# Patient Record
Sex: FEMALE | Race: WHITE | NOT HISPANIC OR LATINO | Employment: FULL TIME | URBAN - METROPOLITAN AREA
[De-identification: names, ages, dates, MRNs, and addresses within clinical notes are randomized per-mention and may not be internally consistent; named-entity substitution may affect disease eponyms.]

---

## 2018-07-20 ENCOUNTER — OFFICE VISIT (OUTPATIENT)
Dept: OBGYN CLINIC | Facility: CLINIC | Age: 53
End: 2018-07-20
Payer: COMMERCIAL

## 2018-07-20 VITALS
WEIGHT: 175 LBS | HEART RATE: 72 BPM | BODY MASS INDEX: 34.36 KG/M2 | DIASTOLIC BLOOD PRESSURE: 84 MMHG | SYSTOLIC BLOOD PRESSURE: 130 MMHG | HEIGHT: 60 IN

## 2018-07-20 DIAGNOSIS — M79.89 MASS OF SOFT TISSUE OF RIGHT UPPER EXTREMITY: Primary | ICD-10-CM

## 2018-07-20 PROCEDURE — 20612 ASPIRATE/INJ GANGLION CYST: CPT | Performed by: ORTHOPAEDIC SURGERY

## 2018-07-20 PROCEDURE — 99203 OFFICE O/P NEW LOW 30 MIN: CPT | Performed by: ORTHOPAEDIC SURGERY

## 2018-07-20 RX ORDER — LEVOTHYROXINE SODIUM 0.07 MG/1
75 TABLET ORAL
COMMUNITY
Start: 2018-05-23

## 2018-07-20 RX ORDER — MONTELUKAST SODIUM 10 MG/1
10 TABLET ORAL
COMMUNITY

## 2018-07-20 RX ORDER — LIDOCAINE HYDROCHLORIDE 10 MG/ML
0.5 INJECTION, SOLUTION INFILTRATION; PERINEURAL
Status: COMPLETED | OUTPATIENT
Start: 2018-07-20 | End: 2018-07-20

## 2018-07-20 RX ORDER — HYDROCHLOROTHIAZIDE 25 MG/1
TABLET ORAL
COMMUNITY
Start: 2018-06-11 | End: 2019-12-31 | Stop reason: ALTCHOICE

## 2018-07-20 RX ORDER — MELATONIN 10 MG
TABLET, SUBLINGUAL SUBLINGUAL
COMMUNITY

## 2018-07-20 RX ORDER — DULOXETIN HYDROCHLORIDE 60 MG/1
60 CAPSULE, DELAYED RELEASE ORAL DAILY
COMMUNITY
Start: 2018-07-06

## 2018-07-20 RX ORDER — BUDESONIDE AND FORMOTEROL FUMARATE DIHYDRATE 160; 4.5 UG/1; UG/1
2 AEROSOL RESPIRATORY (INHALATION) 2 TIMES DAILY
Refills: 12 | COMMUNITY
Start: 2018-07-08

## 2018-07-20 RX ORDER — BETAMETHASONE SODIUM PHOSPHATE AND BETAMETHASONE ACETATE 3; 3 MG/ML; MG/ML
6 INJECTION, SUSPENSION INTRA-ARTICULAR; INTRALESIONAL; INTRAMUSCULAR; SOFT TISSUE
Status: COMPLETED | OUTPATIENT
Start: 2018-07-20 | End: 2018-07-20

## 2018-07-20 RX ADMIN — BETAMETHASONE SODIUM PHOSPHATE AND BETAMETHASONE ACETATE 6 MG: 3; 3 INJECTION, SUSPENSION INTRA-ARTICULAR; INTRALESIONAL; INTRAMUSCULAR; SOFT TISSUE at 15:44

## 2018-07-20 RX ADMIN — LIDOCAINE HYDROCHLORIDE 0.5 ML: 10 INJECTION, SOLUTION INFILTRATION; PERINEURAL at 15:44

## 2018-07-20 NOTE — PROGRESS NOTES
Assessment:     1  Mass of soft tissue of right upper extremity        Plan:     Problem List Items Addressed This Visit        Other    Mass of soft tissue of right upper extremity - Primary     Findings consistent with right wrist soft tissue mass, most likely callous formation due to repetitive pressure irritation  Discussed findings and treatment options with the patient  I have try aspirating the mass but was not able to year old any fluid from it  I did provide patient cortisone injection in the area to see if that will lessen the inflammation  I advised patient to obtain a gel pad to rest on when she writes or type  We will see patient back in 4 weeks for re-evaluation  All patient's questions were answered to her satisfaction  This note is created using dictation transcription  It may contain typographical errors, grammatical errors, improperly dictated words, background noise and other errors  Relevant Medications    lidocaine (XYLOCAINE) 1 % injection 0 5 mL (Completed)    betamethasone acetate-betamethasone sodium phosphate (CELESTONE) injection 6 mg (Completed)    Other Relevant Orders    Hand/upper extremity injection (Completed)         Subjective:     Patient ID: Amador Vela is a 48 y o  female  Chief Complaint:  72-year-old female noticed a mass over the ulna aspect of her wrist  She denies any injury to her wrist  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  She has intermittent discomfort over the mass  She denies pain over the mass when she move her wrist and use her hand  The location of the mass is over the ulnar aspect of the wrist crease  Information on patient's intake form was reviewed        Allergy:  Allergies   Allergen Reactions    Azithromycin Rash    Hyoscyamine Rash    Sulfamethoxazole Rash    Trimethoprim Rash     Medications:  all current active meds have been reviewed  Past Medical History:  Past Medical History:   Diagnosis Date    Asthma     Deep vein thrombosis (DVT) (HCC)     Depression     Factor V Leiden (HCC)     Fibromyalgia     GERD (gastroesophageal reflux disease)     Hypercholesteremia     Hypertension     Hypothyroid     IBS (irritable bowel syndrome)     Migraine     Obesity     Uterine leiomyoma      Past Surgical History:  Past Surgical History:   Procedure Laterality Date    SINUS SURGERY       Family History:  Family History   Problem Relation Age of Onset    Diabetes Mother     Hypertension Mother     Diabetes Father     Hypertension Father     Diabetes Sister      Social History:  History   Alcohol use Not on file     History   Drug use: Unknown     History   Smoking Status    Not on file   Smokeless Tobacco    Not on file     Review of Systems   Constitutional: Negative  HENT: Negative  Eyes: Negative  Respiratory: Negative  Cardiovascular: Negative  Gastrointestinal: Negative  Endocrine: Negative  Genitourinary: Negative  Musculoskeletal: Negative for arthralgias (Right wrist)  Skin: Negative  Allergic/Immunologic: Negative  Neurological: Negative  Hematological: Negative  Psychiatric/Behavioral: Negative  Objective:  BP Readings from Last 1 Encounters:   07/20/18 130/84      Wt Readings from Last 1 Encounters:   07/20/18 79 4 kg (175 lb)      BMI:   Estimated body mass index is 34 18 kg/m² as calculated from the following:    Height as of this encounter: 5' (1 524 m)  Weight as of this encounter: 79 4 kg (175 lb)  BSA:   Estimated body surface area is 1 76 meters squared as calculated from the following:    Height as of this encounter: 5' (1 524 m)  Weight as of this encounter: 79 4 kg (175 lb)  Physical Exam   Constitutional: She is oriented to person, place, and time  She appears well-developed  HENT:   Head: Normocephalic and atraumatic  Eyes: EOM are normal  Pupils are equal, round, and reactive to light  Neck: Neck supple     Neurological: She is alert and oriented to person, place, and time  Skin: Skin is warm  Psychiatric: She has a normal mood and affect  Nursing note and vitals reviewed  Right Hand Exam     Tenderness   The patient is experiencing no tenderness  Range of Motion   The patient has normal right wrist ROM  Muscle Strength   The patient has normal right wrist strength  Other   Erythema: absent  Sensation: normal  Pulse: present    Comments:  Soft tissue mass over ulnar aspect of the wrist crease  The soft tissue mass does not appear to be mobile but translucent with light  Patient's right hand x-ray was not available for review  Radiology report was available for review which show no abnormality      Hand/upper extremity injection  Date/Time: 7/20/2018 3:44 PM  Consent given by: patient  Supporting Documentation  Indications: pain and therapeutic   Procedure Details  Condition:volar carpal ganglion Site: R wrist   Preparation: Patient was prepped and draped in the usual sterile fashion  Needle size: 18 G (20 G)  Ultrasound guidance: no  Approach: volar  Medications administered: 0 5 mL lidocaine 1 %; 6 mg betamethasone acetate-betamethasone sodium phosphate 6 (3-3) mg/mL  Aspirate amount: 0 mL  Patient tolerance: patient tolerated the procedure well with no immediate complications  Dressing:  Sterile dressing applied

## 2018-07-20 NOTE — ASSESSMENT & PLAN NOTE
Findings consistent with right wrist soft tissue mass, most likely callous formation due to repetitive pressure irritation  Discussed findings and treatment options with the patient  I have try aspirating the mass but was not able to year old any fluid from it  I did provide patient cortisone injection in the area to see if that will lessen the inflammation  I advised patient to obtain a gel pad to rest on when she writes or type  We will see patient back in 4 weeks for re-evaluation  All patient's questions were answered to her satisfaction  This note is created using dictation transcription  It may contain typographical errors, grammatical errors, improperly dictated words, background noise and other errors

## 2018-09-19 ENCOUNTER — OFFICE VISIT (OUTPATIENT)
Dept: OBGYN CLINIC | Facility: CLINIC | Age: 53
End: 2018-09-19
Payer: COMMERCIAL

## 2018-09-19 VITALS
BODY MASS INDEX: 34.55 KG/M2 | DIASTOLIC BLOOD PRESSURE: 86 MMHG | WEIGHT: 176 LBS | HEIGHT: 60 IN | SYSTOLIC BLOOD PRESSURE: 130 MMHG

## 2018-09-19 DIAGNOSIS — M79.89 MASS OF SOFT TISSUE OF RIGHT UPPER EXTREMITY: Primary | ICD-10-CM

## 2018-09-19 PROCEDURE — 99213 OFFICE O/P EST LOW 20 MIN: CPT | Performed by: ORTHOPAEDIC SURGERY

## 2018-09-19 NOTE — PROGRESS NOTES
Assessment:     1  Mass of soft tissue of right upper extremity        Plan:     Problem List Items Addressed This Visit        Other    Mass of soft tissue of right upper extremity - Primary     Findings consistent with right wrist soft tissue mass  Discussed findings and treatment options with the patient  Recommend MRI of her right wrist to better assess the soft tissue  I will make further treatment recommendation after the MRI  All patient's questions were answered to her satisfaction  This note is created using dictation transcription  It may contain typographical errors, grammatical errors, improperly dictated words, background noise and other errors  Relevant Orders    MRI wrist right wo contrast         Subjective:     Patient ID: Danny Montoya is a 48 y o  female  Chief Complaint:  53-year-old female noticed a mass over the ulna aspect of her wrist  She denies any injury to her wrist  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  She has intermittent discomfort over the mass  She denies pain over the mass when she move her wrist and use her hand  The location of the mass is over the ulnar aspect of the wrist crease  Cortisone injection from last visit did not help with her symptoms or the size of the mass        Allergy:  Allergies   Allergen Reactions    Azithromycin Rash    Hyoscyamine Rash    Sulfamethoxazole Rash    Trimethoprim Rash     Medications:  all current active meds have been reviewed  Past Medical History:  Past Medical History:   Diagnosis Date    Asthma     [facility]) [facility])     Depression     [facility] Leiden ([facility] [address] )     Fibromyalgia     GERD (gastroesophageal reflux disease)     Hypercholesteremia     Hypertension     Hypothyroid     IBS (irritable bowel syndrome)     Migraine     Obesity     Uterine leiomyoma      Past Surgical History:  Past Surgical History:   Procedure Laterality Date    SINUS SURGERY       Family History:  Family History   Problem Relation Age of Onset    Diabetes Mother     Hypertension Mother     Diabetes Father     Hypertension Father     Diabetes Sister      Social History:  History   Alcohol Use No     History   Drug Use No     History   Smoking Status    Never Smoker   Smokeless Tobacco    Never Used     Review of Systems   Constitutional: Negative  HENT: Negative  Eyes: Negative  Respiratory: Negative  Cardiovascular: Negative  Gastrointestinal: Negative  Endocrine: Negative  Genitourinary: Negative  Musculoskeletal: Positive for arthralgias (Right wrist)  Negative for joint swelling  Skin: Negative  Allergic/Immunologic: Negative  Neurological: Negative  Hematological: Negative  Psychiatric/Behavioral: Negative  Objective:  BP Readings from Last 1 Encounters:   09/19/18 130/86      Wt Readings from Last 1 Encounters:   09/19/18 79 8 kg (176 lb)      BMI:   Estimated body mass index is 34 37 kg/m² as calculated from the following:    Height as of this encounter: 5' (1 524 m)  Weight as of this encounter: 79 8 kg (176 lb)  BSA:   Estimated body surface area is 1 77 meters squared as calculated from the following:    Height as of this encounter: 5' (1 524 m)  Weight as of this encounter: 79 8 kg (176 lb)  Physical Exam   Constitutional: She is oriented to person, place, and time  She appears well-developed  HENT:   Head: Normocephalic and atraumatic  Eyes: EOM are normal  Pupils are equal, round, and reactive to light  Neck: Neck supple  Pulmonary/Chest: Effort normal    Neurological: She is alert and oriented to person, place, and time  Skin: Skin is warm  Psychiatric: She has a normal mood and affect  Nursing note and vitals reviewed  Right Hand Exam     Tenderness   The patient is experiencing no tenderness  Range of Motion   The patient has normal right wrist ROM       Muscle Strength   The patient has normal right wrist strength  Other   Erythema: absent  Sensation: normal  Pulse: present    Comments:  Soft tissue mass over ulnar aspect of the wrist crease  The soft tissue mass does not appear to be mobile but translucent with light  About 1 cm in diameter              Procedures

## 2018-09-19 NOTE — ASSESSMENT & PLAN NOTE
Findings consistent with right wrist soft tissue mass  Discussed findings and treatment options with the patient  Recommend MRI of her right wrist to better assess the soft tissue  I will make further treatment recommendation after the MRI  All patient's questions were answered to her satisfaction  This note is created using dictation transcription  It may contain typographical errors, grammatical errors, improperly dictated words, background noise and other errors

## 2018-09-26 ENCOUNTER — OFFICE VISIT (OUTPATIENT)
Dept: OBGYN CLINIC | Facility: CLINIC | Age: 53
End: 2018-09-26
Payer: COMMERCIAL

## 2018-09-26 VITALS
WEIGHT: 176 LBS | BODY MASS INDEX: 34.55 KG/M2 | HEART RATE: 78 BPM | SYSTOLIC BLOOD PRESSURE: 122 MMHG | HEIGHT: 60 IN | DIASTOLIC BLOOD PRESSURE: 80 MMHG

## 2018-09-26 DIAGNOSIS — M79.89 MASS OF SOFT TISSUE OF RIGHT UPPER EXTREMITY: Primary | ICD-10-CM

## 2018-09-26 PROCEDURE — 99213 OFFICE O/P EST LOW 20 MIN: CPT | Performed by: ORTHOPAEDIC SURGERY

## 2018-09-26 NOTE — PROGRESS NOTES
Assessment:     1  Mass of soft tissue of right upper extremity        Plan:     Problem List Items Addressed This Visit        Other    Mass of soft tissue of right upper extremity - Primary     Findings consistent with right wrist soft tissue mass which was identified as a normal structure of her wrist   Discussed findings and treatment options with the patient  I have review patient's right wrist MRI with her  Patient most likely is irritating the pisiform when she rests her wrist against hard surface  I advised patient to use a gel pad when she has to write or type to avoid continue irritation to the prominence  I advised patient to contact me if her symptoms changed, otherwise will see patient back as needed  All patient's questions were answered to her satisfaction  This note is created using dictation transcription  It may contain typographical errors, grammatical errors, improperly dictated words, background noise and other errors  Subjective:     Patient ID: Carl Chery is a 48 y o  female  Chief Complaint:  65-year-old female noticed a mass over the ulna aspect of her right wrist  She denies any injury to her wrist  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  She has intermittent discomfort over the mass  She denies pain over the mass when she move her wrist and use her hand  The location of the mass is over the ulnar aspect of the wrist crease  Cortisone injection from last visit did not help with her symptoms or the size of the mass  Patient returns today to review her right wrist MRI        Allergy:  Allergies   Allergen Reactions    Azithromycin Rash    Hyoscyamine Rash    Sulfamethoxazole Rash    Trimethoprim Rash     Medications:  all current active meds have been reviewed  Past Medical History:  Past Medical History:   Diagnosis Date    Asthma     Deep vein thrombosis (DVT) (HCC)     Depression     Factor V Leiden (HCC)     Fibromyalgia     GERD (gastroesophageal reflux disease)     Hypercholesteremia     Hypertension     Hypothyroid     IBS (irritable bowel syndrome)     Migraine     Obesity     Uterine leiomyoma      Past Surgical History:  Past Surgical History:   Procedure Laterality Date    SINUS SURGERY       Family History:  Family History   Problem Relation Age of Onset    Diabetes Mother     Hypertension Mother     Diabetes Father     Hypertension Father     Diabetes Sister      Social History:  History   Alcohol Use No     History   Drug Use No     History   Smoking Status    Never Smoker   Smokeless Tobacco    Never Used     Review of Systems   Constitutional: Negative  HENT: Negative  Eyes: Negative  Respiratory: Negative  Cardiovascular: Negative  Gastrointestinal: Negative  Endocrine: Negative  Genitourinary: Negative  Musculoskeletal: Positive for arthralgias (Right wrist)  Negative for joint swelling  Skin: Negative  Allergic/Immunologic: Negative  Neurological: Negative  Negative for weakness and numbness  Hematological: Negative  Psychiatric/Behavioral: Negative  Objective:  BP Readings from Last 1 Encounters:   09/26/18 122/80      Wt Readings from Last 1 Encounters:   09/26/18 79 8 kg (176 lb)      BMI:   Estimated body mass index is 34 37 kg/m² as calculated from the following:    Height as of this encounter: 5' (1 524 m)  Weight as of this encounter: 79 8 kg (176 lb)  BSA:   Estimated body surface area is 1 77 meters squared as calculated from the following:    Height as of this encounter: 5' (1 524 m)  Weight as of this encounter: 79 8 kg (176 lb)  Physical Exam   Constitutional: She is oriented to person, place, and time  She appears well-developed and well-nourished  HENT:   Head: Normocephalic and atraumatic  Eyes: Conjunctivae and EOM are normal    Neck: Neck supple     Pulmonary/Chest: Effort normal    Neurological: She is alert and oriented to person, place, and time  Skin: Skin is warm  Psychiatric: She has a normal mood and affect  Nursing note and vitals reviewed  Right Hand Exam     Tenderness   The patient is experiencing no tenderness  Range of Motion   The patient has normal right wrist ROM  Muscle Strength   The patient has normal right wrist strength  Other   Erythema: absent  Sensation: normal  Pulse: present    Comments:  Soft tissue mass over ulnar aspect of the wrist crease  The soft tissue mass does not appear to be mobile but translucent with light  About 1 cm in diameter  Examination not changed            Right wrist MRI on a disc showed no soft tissue lesion over the volar ulnar aspect of the wrist   Report was also reviewed with the patient  The the mass appeared to be identified as Pisiform and FCU tendon      Procedures

## 2018-09-26 NOTE — ASSESSMENT & PLAN NOTE
Findings consistent with right wrist soft tissue mass which was identified as a normal structure of her wrist   Discussed findings and treatment options with the patient  I have review patient's right wrist MRI with her  Patient most likely is irritating the pisiform when she rests her wrist against hard surface  I advised patient to use a gel pad when she has to write or type to avoid continue irritation to the prominence  I advised patient to contact me if her symptoms changed, otherwise will see patient back as needed  All patient's questions were answered to her satisfaction  This note is created using dictation transcription  It may contain typographical errors, grammatical errors, improperly dictated words, background noise and other errors

## 2019-12-31 ENCOUNTER — OFFICE VISIT (OUTPATIENT)
Dept: GASTROENTEROLOGY | Facility: CLINIC | Age: 54
End: 2019-12-31
Payer: COMMERCIAL

## 2019-12-31 VITALS
HEIGHT: 60 IN | HEART RATE: 98 BPM | DIASTOLIC BLOOD PRESSURE: 84 MMHG | SYSTOLIC BLOOD PRESSURE: 130 MMHG | WEIGHT: 175 LBS | BODY MASS INDEX: 34.36 KG/M2

## 2019-12-31 DIAGNOSIS — Z86.010 HISTORY OF COLON POLYPS: Primary | ICD-10-CM

## 2019-12-31 DIAGNOSIS — K58.1 IRRITABLE BOWEL SYNDROME WITH CONSTIPATION: Primary | ICD-10-CM

## 2019-12-31 DIAGNOSIS — Z86.010 HISTORY OF COLON POLYPS: ICD-10-CM

## 2019-12-31 DIAGNOSIS — K21.9 GASTROESOPHAGEAL REFLUX DISEASE, ESOPHAGITIS PRESENCE NOT SPECIFIED: ICD-10-CM

## 2019-12-31 PROCEDURE — 99244 OFF/OP CNSLTJ NEW/EST MOD 40: CPT | Performed by: INTERNAL MEDICINE

## 2019-12-31 RX ORDER — DOCUSATE SODIUM 100 MG/1
100 CAPSULE, LIQUID FILLED ORAL 2 TIMES DAILY
Qty: 10 CAPSULE | Refills: 0 | Status: SHIPPED | OUTPATIENT
Start: 2019-12-31 | End: 2020-02-20 | Stop reason: SDUPTHER

## 2019-12-31 RX ORDER — ALBUTEROL SULFATE 90 UG/1
2 AEROSOL, METERED RESPIRATORY (INHALATION) EVERY 6 HOURS PRN
COMMUNITY

## 2019-12-31 RX ORDER — FLUTICASONE PROPIONATE 50 MCG
2 SPRAY, SUSPENSION (ML) NASAL DAILY
COMMUNITY

## 2019-12-31 RX ORDER — ATORVASTATIN CALCIUM 10 MG/1
1 TABLET, FILM COATED ORAL DAILY
COMMUNITY
Start: 2019-11-13

## 2019-12-31 RX ORDER — LISINOPRIL AND HYDROCHLOROTHIAZIDE 12.5; 1 MG/1; MG/1
1 TABLET ORAL DAILY
COMMUNITY
Start: 2019-12-26

## 2019-12-31 NOTE — PROGRESS NOTES
[id number] - Outpatient Consultation  Carolyn Montana 48 y o  female MRN: [id number]  Encounter: [id number]    ASSESSMENT AND PLAN:      1  Irritable bowel syndrome with constipation  I believe the majority of her symptoms go along with this  One interesting thing is when she took antibiotics for a sinus infection she thought her bowels got a little bit better  Other possibilities would be small intestinal bacterial overgrowth  I do not believe this is diverticulitis  I would recommend a breath test, colonoscopy trial of Linzess and Colace  - linaCLOtide 290 MCG CAPS; Take 1 capsule by mouth daily  Dispense: 30 capsule; Refill: 2  - docusate sodium (COLACE) 100 mg capsule; Take 1 capsule (100 mg total) by mouth 2 (two) times a day  Dispense: 10 capsule; Refill: 0    2  History of colon polyps  Will be due soon for follow-up    3  Gastroesophageal reflux disease, esophagitis presence not specified  Presently stable      Followup Appointment:  6 weeks  ______________________________________________________________________    Chief Complaint   Patient presents with    Irritable Bowel Syndrome    Constipation     getting worse and BM only once a week    Anal Lump       HPI:   Patient is a very pleasant 79-year-old female who we last saw in the office in 2012 for acid reflux and irritable bowel  She does have a history of colon polyps  She reports that over the last several months she has been increasingly constipated she moves her bowels perhaps once or twice a week  It is hard no bleeding that she has a vague lower abdominal bloating and discomfort  She denies any fever sweats or chills  Her acid reflux is generally controlled she has no symptoms of heartburn no dysphagia no nausea or vomiting  Her weight has been stable or increasing    Past Medical History:   Diagnosis Date    Asthma     [facility]T) ([facility] )     Depression     Factor V Leiden ([facility] )  Fibromyalgia     GERD (gastroesophageal reflux disease)     Hypercholesteremia     Hypertension     Hypothyroid     IBS (irritable bowel syndrome)     Migraine     Obesity     Uterine leiomyoma      Past Surgical History:   Procedure Laterality Date    COLONOSCOPY  12/15/2017    EGD  04/13/2012    SINUS SURGERY       Social History     Substance and Sexual Activity   Alcohol Use No     Social History     Substance and Sexual Activity   Drug Use No     Social History     Tobacco Use   Smoking Status Never Smoker   Smokeless Tobacco Never Used     Family History   Problem Relation Age of Onset    Diabetes Mother     Hypertension Mother     Diabetes Father     Hypertension Father     Diabetes Sister     Polycystic ovary syndrome Sister     Thyroid disease Sister     Colon cancer Neg Hx     Colon polyps Neg Hx        Meds/Allergies     Current Outpatient Medications:     albuterol (PROVENTIL HFA,VENTOLIN HFA) 90 mcg/act inhaler    atorvastatin (LIPITOR) 10 mg tablet    DULoxetine (CYMBALTA) 60 mg delayed release capsule    fluticasone (FLONASE) 50 mcg/act nasal spray    levothyroxine 75 mcg tablet    lisinopril-hydrochlorothiazide (PRINZIDE,ZESTORETIC) 10-12 5 MG per tablet    loratadine-pseudoephedrine (CLARITIN-D 12-HOUR) 5-120 mg per tablet    montelukast (SINGULAIR) 10 mg tablet    Probiotic Product (PROBIOTIC ADVANCED PO)    SYMBICORT 160-4 5 MCG/ACT inhaler    Cholecalciferol (VITAMIN D3) 15791 units TABS    cyanocobalamin 1000 MCG tablet    docusate sodium (COLACE) 100 mg capsule    linaCLOtide 290 MCG CAPS    Allergies   Allergen Reactions    Azithromycin Rash    Hyoscyamine Rash    Sulfamethoxazole Rash    Trimethoprim Rash       PHYSICAL EXAM:    Blood pressure 130/84, pulse 98, height 4' 11 5" (1 511 m), weight 79 4 kg (175 lb)  Body mass index is 34 75 kg/m²    General Appearance: NAD, cooperative, alert  Eyes: Anicteric, PERRLA, EOMI  ENT:  Normocephalic, atraumatic, normal mucosa  Neck:  Supple, symmetrical, trachea midline,   Resp:  Clear to auscultation bilaterally; no rales, rhonchi or wheezing; respirations unlabored   CV:  S1 S2, Regular rate and rhythm; no murmur, rub, or gallop  GI:  Soft, mild lower abdominal tenderness non-distended; normal bowel sounds; no masses, no organomegaly   Rectal:  Normal on external inspection  Musculoskeletal: No cyanosis, clubbing or edema  Normal ROM  Skin:  No jaundice, rashes, or lesions   Heme/Lymph: No palpable cervical lymphadenopathy  Psych: Normal affect, good eye contact  Neuro: No gross deficits, AAOx3    Lab Results:   No results found for: WBC, HGB, HCT, MCV, PLT  No results found for: NA, K, CL, CO2, ANIONGAP, BUN, CREATININE, GLUCOSE, GLUF, CALCIUM, CORRECTEDCA, AST, ALT, ALKPHOS, PROT, BILITOT, EGFR  No results found for: IRON, TIBC, FERRITIN  No results found for: LIPASE    Radiology Results:   No results found  REVIEW OF SYSTEMS:    CONSTITUTIONAL: Denies any fever, chills, rigors, and weight loss  HEENT: No earache or tinnitus  Denies hearing loss or visual disturbances  CARDIOVASCULAR: No chest pain or palpitations  RESPIRATORY: Denies any cough, hemoptysis, shortness of breath or dyspnea on exertion  GASTROINTESTINAL: As noted in the History of Present Illness  GENITOURINARY: No problems with urination  Denies any hematuria or dysuria  NEUROLOGIC: No dizziness or vertigo, denies headaches  MUSCULOSKELETAL: Denies any muscle or joint pain  SKIN: Denies skin rashes or itching  ENDOCRINE: Denies excessive thirst  Denies intolerance to heat or cold  PSYCHOSOCIAL: Denies depression or anxiety  Denies any recent memory loss

## 2020-01-15 ENCOUNTER — OFFICE VISIT (OUTPATIENT)
Dept: GASTROENTEROLOGY | Facility: CLINIC | Age: 55
End: 2020-01-15
Payer: COMMERCIAL

## 2020-01-15 DIAGNOSIS — K63.89 SMALL INTESTINAL BACTERIAL OVERGROWTH: ICD-10-CM

## 2020-01-15 DIAGNOSIS — K58.1 IRRITABLE BOWEL SYNDROME WITH CONSTIPATION: Primary | ICD-10-CM

## 2020-01-15 PROCEDURE — 91065 BREATH HYDROGEN/METHANE TEST: CPT | Performed by: INTERNAL MEDICINE

## 2020-01-15 NOTE — PROGRESS NOTES
[facility]  [address] [place] [address]   [phone]    Bacterial Overgrowth Analytical Record    Sam Ontiveros 50 y o  female MRN: [id number]      Date of Test: 1/15/20    Substrate Given: Lactulose    Ordering Provider: Dr Caroline Jensen Assistant: All    Symptoms: constipation and IBS    The patient presents for bacterial overgrowth testing  Patient fasted overnight  Baseline readings obtained  substrate was administered, and the patient drink without difficulty  Breath test performed every 20 min for a total of 3 hr    Sample Clock Time ppmH2 ppmCH4 Co2% Carlitos   Baseline 8:26 am   1 1 4 3 1 23   #1  20 minutes 8:48 am 6 1 5 5 0 97   #2  40 minutes 9:08 am 3 1 4 9 1 08   #3  60 minutes 9:29 am 5 1 5 1 1 03   #4  80 minutes 9:49 am 5 1 4 6 1 15   #5  100 minutes 10:09 am 19 3 4 1 1 29   #6  120 minutes 10:28 am 47 3 4 7 1 12   #7  140 minutes 10:49 am 87 5 4 0 1 32   #8  160 minutes 11:10 am 107 5 4 4 1 20   #9  180 minutes 11:30 am 106 5 4 0 1 32       Physician interpretation:  Positive breath test will notify patient and call in antibiotics

## 2020-01-16 RX ORDER — AMOXICILLIN AND CLAVULANATE POTASSIUM 875; 125 MG/1; MG/1
1 TABLET, FILM COATED ORAL EVERY 12 HOURS SCHEDULED
Qty: 20 TABLET | Refills: 0 | Status: SHIPPED | OUTPATIENT
Start: 2020-01-16 | End: 2020-01-17 | Stop reason: SDUPTHER

## 2020-01-16 NOTE — PATIENT INSTRUCTIONS
I left a voicemail for the patient regarding positive breath test call in Augmentin    I reviewed her allergy list she is not allergic to that

## 2020-01-17 DIAGNOSIS — K63.89 SMALL INTESTINAL BACTERIAL OVERGROWTH: ICD-10-CM

## 2020-01-17 RX ORDER — AMOXICILLIN AND CLAVULANATE POTASSIUM 875; 125 MG/1; MG/1
1 TABLET, FILM COATED ORAL EVERY 12 HOURS SCHEDULED
Qty: 28 TABLET | Refills: 0 | Status: SHIPPED | OUTPATIENT
Start: 2020-01-17 | End: 2020-01-31

## 2020-01-17 NOTE — TELEPHONE ENCOUNTER
Patient wanted to know if ok to proceed with colon on 1/30/2020  She is to start Augmentin for SIBO which would end 1/31/2020  Advised ok to proceed with colonoscopy

## 2020-01-17 NOTE — TELEPHONE ENCOUNTER
Pt left [facility] stating she got call yesterday from Dr Vena Osler about breath test that came back positive; is supposed to take Abx for 2 wks and has colon sched'd 1/30; asks for [phone] to discuss/questions if she needs to reschedule?

## 2020-02-20 ENCOUNTER — OFFICE VISIT (OUTPATIENT)
Dept: GASTROENTEROLOGY | Facility: CLINIC | Age: 55
End: 2020-02-20
Payer: COMMERCIAL

## 2020-02-20 VITALS
BODY MASS INDEX: 34.95 KG/M2 | SYSTOLIC BLOOD PRESSURE: 140 MMHG | DIASTOLIC BLOOD PRESSURE: 90 MMHG | WEIGHT: 178 LBS | HEART RATE: 96 BPM | HEIGHT: 60 IN

## 2020-02-20 DIAGNOSIS — Z86.010 HISTORY OF COLON POLYPS: ICD-10-CM

## 2020-02-20 DIAGNOSIS — K63.89 SMALL INTESTINAL BACTERIAL OVERGROWTH: Primary | ICD-10-CM

## 2020-02-20 DIAGNOSIS — K58.1 IRRITABLE BOWEL SYNDROME WITH CONSTIPATION: ICD-10-CM

## 2020-02-20 DIAGNOSIS — K21.9 GASTROESOPHAGEAL REFLUX DISEASE, ESOPHAGITIS PRESENCE NOT SPECIFIED: ICD-10-CM

## 2020-02-20 PROCEDURE — 99213 OFFICE O/P EST LOW 20 MIN: CPT | Performed by: INTERNAL MEDICINE

## 2020-02-20 RX ORDER — DOCUSATE SODIUM 100 MG/1
100 CAPSULE, LIQUID FILLED ORAL 2 TIMES DAILY
Qty: 10 CAPSULE | Refills: 0 | Status: SHIPPED | OUTPATIENT
Start: 2020-02-20

## 2020-02-20 NOTE — PROGRESS NOTES
[facility] - Outpatient Follow-up Note  Jamal Green 50 y o  female MRN: [id number]  Encounter: [id number]    ASSESSMENT AND PLAN:      1  Irritable bowel syndrome with constipation  We had a long discussion regarding the merits of fiber hydration I have asked her to continue her Colace and Linzess  The next step would be harsh her medications which I really do not want to do  She did not do well with MiraLax  - docusate sodium (COLACE) 100 mg capsule; Take 1 capsule (100 mg total) by mouth 2 (two) times a day  Dispense: 10 capsule; Refill: 0  - linaCLOtide 290 MCG CAPS; Take 1 capsule by mouth daily  Dispense: 30 capsule; Refill: 2    2  Small intestinal bacterial overgrowth  Treated minimal improvement    3  History of colon polyps  Negative colonoscopy just done 5 year recall    4  Gastroesophageal reflux disease, esophagitis presence not specified  All stable      Followup Appointment:  6 months  ______________________________________________________________________    Chief Complaint   Patient presents with    Follow-up     Colon/SIBO     HPI:  Treatment of the bacterial overgrowth with antibiotics resulted in attend 20% improvement  She now continues to complain of bloating and constipation Linzess helps but not 100%  Minimal symptoms of reflux      Historical Information   Past Medical History:   Diagnosis Date    Asthma     [facility]     Depression     Factor V Leiden ([facility])     Fibromyalgia     GERD (gastroesophageal reflux disease)     Hypercholesteremia     Hypertension     Hypothyroid     IBS (irritable bowel syndrome)     Migraine     Obesity     Uterine leiomyoma      Past Surgical History:   Procedure Laterality Date    COLONOSCOPY  12/15/2017    EGD  04/13/2012    SINUS SURGERY       Social History     Substance and Sexual Activity   Alcohol Use No     Social History     Substance and Sexual Activity   Drug Use No Social History     Tobacco Use   Smoking Status Never Smoker   Smokeless Tobacco Never Used     Family History   Problem Relation Age of Onset    Diabetes Mother     Hypertension Mother     Diabetes Father     Hypertension Father     Diabetes Sister     Polycystic ovary syndrome Sister     Thyroid disease Sister     Colon cancer Neg Hx     Colon polyps Neg Hx          Current Outpatient Medications:     albuterol (PROVENTIL HFA,VENTOLIN HFA) 90 mcg/act inhaler    atorvastatin (LIPITOR) 10 mg tablet    Cholecalciferol (VITAMIN D3) 88270 units TABS    cyanocobalamin 1000 MCG tablet    docusate sodium (COLACE) 100 mg capsule    DULoxetine (CYMBALTA) 60 mg delayed release capsule    fluticasone (FLONASE) 50 mcg/act nasal spray    levothyroxine 75 mcg tablet    linaCLOtide 290 MCG CAPS    lisinopril-hydrochlorothiazide (PRINZIDE,ZESTORETIC) 10-12 5 MG per tablet    loratadine-pseudoephedrine (CLARITIN-D 12-HOUR) 5-120 mg per tablet    montelukast (SINGULAIR) 10 mg tablet    Probiotic Product (PROBIOTIC ADVANCED PO)    SYMBICORT 160-4 5 MCG/ACT inhaler  Allergies   Allergen Reactions    Azithromycin Rash    Hyoscyamine Rash    Sulfamethoxazole Rash    Trimethoprim Rash     Reviewed medications and allergies and updated as indicated    PHYSICAL EXAM:    Blood pressure 140/90, pulse 96, height 4' 11 5" (1 511 m), weight 80 7 kg (178 lb)  Body mass index is 35 35 kg/m²  General Appearance: NAD, cooperative, alert  Eyes: Anicteric, PERRLA, EOMI  ENT:  Normocephalic, atraumatic, normal mucosa  Neck: , symmetrical, trachea midline  Rectal: Deferred  Musculoskeletal: No cyanosis, clubbing or edema  Normal ROM    Skin:  No jaundice, rashes, or lesions   Psych: Normal affect, good eye contact  Neuro: No gross deficits, AAOx3    Lab Results:   No results found for: WBC, HGB, HCT, MCV, PLT  No results found for: NA, K, CL, CO2, ANIONGAP, BUN, CREATININE, GLUCOSE, GLUF, CALCIUM, CORRECTEDCA, AST, ALT, ALKPHOS, PROT, BILITOT, EGFR  No results found for: IRON, TIBC, FERRITIN  No results found for: LIPASE    Radiology Results:   No results found

## 2020-06-16 DIAGNOSIS — K58.1 IRRITABLE BOWEL SYNDROME WITH CONSTIPATION: ICD-10-CM

## 2020-06-16 RX ORDER — LINACLOTIDE 290 UG/1
CAPSULE, GELATIN COATED ORAL
Qty: 30 CAPSULE | Refills: 2 | Status: SHIPPED | OUTPATIENT
Start: 2020-06-16 | End: 2020-09-04

## 2020-08-21 ENCOUNTER — OFFICE VISIT (OUTPATIENT)
Dept: GASTROENTEROLOGY | Facility: CLINIC | Age: 55
End: 2020-08-21
Payer: COMMERCIAL

## 2020-08-21 VITALS
TEMPERATURE: 96.4 F | WEIGHT: 175 LBS | HEIGHT: 60 IN | DIASTOLIC BLOOD PRESSURE: 80 MMHG | SYSTOLIC BLOOD PRESSURE: 128 MMHG | HEART RATE: 96 BPM | BODY MASS INDEX: 34.36 KG/M2

## 2020-08-21 DIAGNOSIS — K58.1 IRRITABLE BOWEL SYNDROME WITH CONSTIPATION: Primary | ICD-10-CM

## 2020-08-21 DIAGNOSIS — K21.9 GASTROESOPHAGEAL REFLUX DISEASE, ESOPHAGITIS PRESENCE NOT SPECIFIED: ICD-10-CM

## 2020-08-21 DIAGNOSIS — K63.89 SMALL INTESTINAL BACTERIAL OVERGROWTH: ICD-10-CM

## 2020-08-21 DIAGNOSIS — Z86.010 HISTORY OF COLON POLYPS: ICD-10-CM

## 2020-08-21 PROCEDURE — 99213 OFFICE O/P EST LOW 20 MIN: CPT | Performed by: INTERNAL MEDICINE

## 2020-08-21 NOTE — PROGRESS NOTES
[address] [facility] - Outpatient Follow-up Note  Cj Powers 50 y o  female MRN: [id number]  Encounter: [id number]    ASSESSMENT AND PLAN:      1  Irritable bowel syndrome with constipation  Is generally doing better taking Metamucil 2 capsules with a oz of water Senokot as well as Linzess  I asked her to resume Colace 200 mg daily  We discussed the merits of staying hydrated as well as physical activity which has declined somewhat for her during the [place]  2  Small intestinal bacterial overgrowth  Mild improvement with previous treatment  3  History of colon polyps  Up-to-date next colonoscopy 2025    4  Gastroesophageal reflux disease, esophagitis presence not specified  Stable asymptomatic      Followup Appointment:  1 year  ______________________________________________________________________    Chief Complaint   Patient presents with    Follow-up    Irritable Bowel Syndrome     HPI:  Doing reasonably well about once a week feels bloated  Generally moving her bowels better but still feels constipated  No rectal bleeding  Going several times a week      Historical Information   Past Medical History:   Diagnosis Date    Asthma     Deep vein thrombosis (DVT) (HCC)     Depression     Factor V Leiden (HCC)     Fibromyalgia     GERD (gastroesophageal reflux disease)     Hypercholesteremia     Hypertension     Hypothyroid     IBS (irritable bowel syndrome)     Migraine     Obesity     Uterine leiomyoma      Past Surgical History:   Procedure Laterality Date    COLONOSCOPY  12/15/2017    EGD  04/13/2012    SINUS SURGERY       Social History     Substance and Sexual Activity   Alcohol Use No     Social History     Substance and Sexual Activity   Drug Use No     Social History     Tobacco Use   Smoking Status Never Smoker   Smokeless Tobacco Never Used     Family History   Problem Relation Age of Onset    Diabetes Mother     Hypertension Mother    [facility] Diabetes Father     Hypertension Father     Diabetes Sister     Polycystic ovary syndrome Sister     Thyroid disease Sister     Colon cancer Neg Hx     Colon polyps Neg Hx          Current Outpatient Medications:     albuterol (PROVENTIL HFA,VENTOLIN HFA) 90 mcg/act inhaler    atorvastatin (LIPITOR) 10 mg tablet    Cholecalciferol (VITAMIN D3) 16537 units TABS    cyanocobalamin 1000 MCG tablet    DULoxetine (CYMBALTA) 60 mg delayed release capsule    fluticasone (FLONASE) 50 mcg/act nasal spray    levothyroxine 75 mcg tablet    LINZESS 290 MCG CAPS    lisinopril-hydrochlorothiazide (PRINZIDE,ZESTORETIC) 10-12 5 MG per tablet    loratadine-pseudoephedrine (CLARITIN-D 12-HOUR) 5-120 mg per tablet    montelukast (SINGULAIR) 10 mg tablet    Probiotic Product (PROBIOTIC ADVANCED PO)    SYMBICORT 160-4 5 MCG/ACT inhaler    docusate sodium (COLACE) 100 mg capsule  Allergies   Allergen Reactions    Azithromycin Rash    Hyoscyamine Rash    Sulfamethoxazole Rash    Trimethoprim Rash     Reviewed medications and allergies and updated as indicated    PHYSICAL EXAM:    Blood pressure 128/80, pulse 96, temperature (!) 96 4 °F (35 8 °C), height 5' (1 524 m), weight 79 4 kg (175 lb)  Body mass index is 34 18 kg/m²  General Appearance: NAD, cooperative, alert  Eyes: Anicteric, PERRLA, EOMI  ENT:  Normocephalic, atraumatic, normal mucosa  Neck:  Supple, symmetrical, trachea midline  Resp:  Clear to auscultation bilaterally; no rales, rhonchi or wheezing; respirations unlabored   CV:  S1 S2, Regular rate and rhythm; no murmur, rub, or gallop  GI:  Soft, non-tender, non-distended; normal bowel sounds; no masses, no organomegaly   Rectal: Deferred  Musculoskeletal: No cyanosis, clubbing or edema  Normal ROM    Skin:  No jaundice, rashes, or lesions   Heme/Lymph: No palpable cervical lymphadenopathy  Psych: Normal affect, good eye contact  Neuro: No gross deficits, AAOx3    Lab Results:   No results found for: WBC, HGB, HCT, MCV, PLT  No results found for: NA, K, CL, CO2, ANIONGAP, BUN, CREATININE, GLUCOSE, GLUF, CALCIUM, CORRECTEDCA, AST, ALT, ALKPHOS, PROT, BILITOT, EGFR  No results found for: IRON, TIBC, FERRITIN  No results found for: LIPASE    Radiology Results:   No results found

## 2020-09-04 DIAGNOSIS — K58.1 IRRITABLE BOWEL SYNDROME WITH CONSTIPATION: ICD-10-CM

## 2020-09-04 RX ORDER — LINACLOTIDE 290 UG/1
CAPSULE, GELATIN COATED ORAL
Qty: 30 CAPSULE | Refills: 2 | Status: SHIPPED | OUTPATIENT
Start: 2020-09-04 | End: 2020-12-03 | Stop reason: SDUPTHER

## 2020-12-03 DIAGNOSIS — K58.1 IRRITABLE BOWEL SYNDROME WITH CONSTIPATION: ICD-10-CM

## 2020-12-03 RX ORDER — LINACLOTIDE 290 UG/1
1 CAPSULE, GELATIN COATED ORAL DAILY
Qty: 30 CAPSULE | Refills: 2 | Status: SHIPPED | OUTPATIENT
Start: 2020-12-03 | End: 2021-03-09

## 2021-03-09 DIAGNOSIS — K58.1 IRRITABLE BOWEL SYNDROME WITH CONSTIPATION: ICD-10-CM

## 2021-03-09 RX ORDER — LINACLOTIDE 290 UG/1
CAPSULE, GELATIN COATED ORAL
Qty: 30 CAPSULE | Refills: 2 | Status: SHIPPED | OUTPATIENT
Start: 2021-03-09 | End: 2021-06-21

## 2021-03-10 DIAGNOSIS — Z23 ENCOUNTER FOR IMMUNIZATION: ICD-10-CM

## 2021-03-18 ENCOUNTER — IMMUNIZATIONS (OUTPATIENT)
Dept: FAMILY MEDICINE CLINIC | Facility: HOSPITAL | Age: 56
End: 2021-03-18

## 2021-03-18 DIAGNOSIS — Z23 ENCOUNTER FOR IMMUNIZATION: Primary | ICD-10-CM

## 2021-03-18 PROCEDURE — 91301 SARS-COV-2 / COVID-19 MRNA VACCINE (MODERNA) 100 MCG: CPT

## 2021-03-18 PROCEDURE — 0011A SARS-COV-2 / COVID-19 MRNA VACCINE (MODERNA) 100 MCG: CPT

## 2021-04-16 ENCOUNTER — IMMUNIZATIONS (OUTPATIENT)
Dept: FAMILY MEDICINE CLINIC | Facility: HOSPITAL | Age: 56
End: 2021-04-16

## 2021-04-16 DIAGNOSIS — Z23 ENCOUNTER FOR IMMUNIZATION: Primary | ICD-10-CM

## 2021-04-16 PROCEDURE — 0012A SARS-COV-2 / COVID-19 MRNA VACCINE (MODERNA) 100 MCG: CPT

## 2021-04-16 PROCEDURE — 91301 SARS-COV-2 / COVID-19 MRNA VACCINE (MODERNA) 100 MCG: CPT

## 2021-05-26 ENCOUNTER — TELEPHONE (OUTPATIENT)
Dept: OBGYN CLINIC | Facility: CLINIC | Age: 56
End: 2021-05-26

## 2021-05-26 NOTE — TELEPHONE ENCOUNTER
Raven Bone is c/o breast rash again and requesting medication renewal to [facility] pharmacy,[address],[place]  This nurse spoke with [facility] Pharmacist, whom states Raven Bone has refill remaining on Clotrimazole-Betamethasone 1-0 05% lotion  Informed [place] of medication refill and [facility] pharmacy,getting Rx ready for  for her

## 2021-06-19 DIAGNOSIS — K58.1 IRRITABLE BOWEL SYNDROME WITH CONSTIPATION: ICD-10-CM

## 2021-06-21 RX ORDER — LINACLOTIDE 290 UG/1
CAPSULE, GELATIN COATED ORAL
Qty: 30 CAPSULE | Refills: 2 | Status: SHIPPED | OUTPATIENT
Start: 2021-06-21 | End: 2021-09-13 | Stop reason: SDUPTHER

## 2021-07-22 ENCOUNTER — VBI (OUTPATIENT)
Dept: ADMINISTRATIVE | Facility: OTHER | Age: 56
End: 2021-07-22

## 2021-07-22 NOTE — TELEPHONE ENCOUNTER
Upon review of the In Basket request we were able to locate, review, and update the patient chart as requested for Immunization(s) Influenza, Mammogram and Pap Smear (HPV) aka Cervical Cancer Screening  Any additional questions or concerns should be emailed to the Practice Liaisons via [email or web address] email, please do not reply via In Basket      Thank you  Lesa Avery

## 2021-09-13 ENCOUNTER — OFFICE VISIT (OUTPATIENT)
Dept: GASTROENTEROLOGY | Facility: CLINIC | Age: 56
End: 2021-09-13
Payer: COMMERCIAL

## 2021-09-13 ENCOUNTER — ANNUAL EXAM (OUTPATIENT)
Dept: OBGYN CLINIC | Facility: CLINIC | Age: 56
End: 2021-09-13
Payer: COMMERCIAL

## 2021-09-13 VITALS
HEIGHT: 60 IN | DIASTOLIC BLOOD PRESSURE: 72 MMHG | BODY MASS INDEX: 35.73 KG/M2 | SYSTOLIC BLOOD PRESSURE: 122 MMHG | WEIGHT: 182 LBS

## 2021-09-13 VITALS
DIASTOLIC BLOOD PRESSURE: 82 MMHG | BODY MASS INDEX: 35.53 KG/M2 | SYSTOLIC BLOOD PRESSURE: 120 MMHG | WEIGHT: 181 LBS | HEIGHT: 60 IN

## 2021-09-13 DIAGNOSIS — K58.1 IRRITABLE BOWEL SYNDROME WITH CONSTIPATION: ICD-10-CM

## 2021-09-13 DIAGNOSIS — D21.9 FIBROIDS: ICD-10-CM

## 2021-09-13 DIAGNOSIS — Z01.419 ROUTINE GYNECOLOGICAL EXAMINATION: Primary | ICD-10-CM

## 2021-09-13 DIAGNOSIS — Z86.010 HISTORY OF COLON POLYPS: Primary | ICD-10-CM

## 2021-09-13 DIAGNOSIS — Z78.0 POST-MENOPAUSAL: ICD-10-CM

## 2021-09-13 DIAGNOSIS — K21.9 GASTROESOPHAGEAL REFLUX DISEASE WITHOUT ESOPHAGITIS: ICD-10-CM

## 2021-09-13 DIAGNOSIS — Z12.31 ENCOUNTER FOR SCREENING MAMMOGRAM FOR MALIGNANT NEOPLASM OF BREAST: ICD-10-CM

## 2021-09-13 DIAGNOSIS — Z12.4 CERVICAL CANCER SCREENING: ICD-10-CM

## 2021-09-13 DIAGNOSIS — L24.9 IRRITANT CONTACT DERMATITIS, UNSPECIFIED TRIGGER: ICD-10-CM

## 2021-09-13 DIAGNOSIS — N95.1 VAGINAL DRYNESS, MENOPAUSAL: ICD-10-CM

## 2021-09-13 PROCEDURE — 99396 PREV VISIT EST AGE 40-64: CPT | Performed by: OBSTETRICS & GYNECOLOGY

## 2021-09-13 PROCEDURE — 99213 OFFICE O/P EST LOW 20 MIN: CPT | Performed by: INTERNAL MEDICINE

## 2021-09-13 RX ORDER — CLOTRIMAZOLE AND BETAMETHASONE DIPROPIONATE 10; .64 MG/G; MG/G
CREAM TOPICAL
Qty: 45 G | Refills: 3 | Status: SHIPPED | OUTPATIENT
Start: 2021-09-13

## 2021-09-13 RX ORDER — LINACLOTIDE 290 UG/1
1 CAPSULE, GELATIN COATED ORAL DAILY
Qty: 30 CAPSULE | Refills: 2 | Status: SHIPPED | OUTPATIENT
Start: 2021-09-13 | End: 2021-12-28

## 2021-09-13 NOTE — PROGRESS NOTES
[address], [address], [facility], [address] [facility]    ASSESSMENT/PLAN: Kimmie Powell is a 49 y o  Malini Range who presents for annual gynecologic exam     Encounter for routine gynecologic examination  - Routine well woman exam completed today  - Cervical Cancer Screening: Current ASCCP Guidelines reviewed  Last Pap: 04/16/2018   Next Pap Due: today   - COVID vaccine 3/2021  Flu shot neg   - Contraceptive counseling discussed  Current contraception: none or condoms:   - Breast Cancer Screening: Last Mammogram 08/01/2020,  scheduling   - Colorectal cancer screening /1 2020  Next   2025   - The following were reviewed in today's visit: breast self exam, mammography screening ordered, menopause, osteoporosis, adequate intake of calcium and vitamin D, exercise, healthy diet and colonoscopy discussed   Additional problems addressed during this visit:  1  Routine gynecological examination    2  Encounter for screening mammogram for malignant neoplasm of breast  -     Mammo screening bilateral w 3d & cad; Future; Expected date: 09/13/2022    3  Cervical cancer screening  -     IGP, Aptima HPV, Rfx 16/18,45    4  Vaginal dryness, menopausal  Comments:  dw pt  moisurizers for day to day and  lubricants for  relations      5  BMI 35 0-35 9,adult    6  Irritant contact dermatitis, unspecified trigger  -     clotrimazole-betamethasone (LOTRISONE) 1-0 05 % cream; Use  under breasts  Twice daily    7  Fibroids  Comments:  if increase in discomfort will do  tv us      8  Post-menopausal        CC: Annual Gynecologic Examination    HPI: Kimmie Powell is a 49 y o  [place] Range who presents for annual gynecologic examination  50 yo here for wellness exam     No menses in 2 years    Denies  Bleeding , bloting or satiety   +  Lower  abd  Pressure and  Slow  Release of   Urine   + hx of   IBS      The following portions of the patient's history were reviewed and updated as appropriate: She  has a past medical history of Asthma, Colon polyps, Deep vein thrombosis (DVT) ([facility] [address] ), Depression, [facility] Fibromyalgia, GERD (gastroesophageal reflux disease), Hypercholesteremia, Hypertension, Hypothyroid, IBS (irritable bowel syndrome), Migraine, Obesity, Papanicolaou smear (04/06/2018), and Uterine leiomyoma  She  has a past surgical history that includes Sinus surgery (2002); Colonoscopy (12/15/2017); EGD (04/13/2012); Mammo (historical) (08/01/2020); Colonoscopy (01/31/2020); Foot surgery (1998); and Endometrial biopsy (03/2019)  Her family history includes Alzheimer's disease in her father; Breast cancer in her maternal aunt; Diabetes in her father, mother, and sister; Heart attack in her father; Heart disease in her father; Hypertension in her father and mother; Polycystic ovary syndrome in her sister; Stroke in her mother; Thyroid disease in her sister  She  reports that she has never smoked  She has never used smokeless tobacco  She reports that she does not drink alcohol and does not use drugs    Current Outpatient Medications   Medication Sig Dispense Refill    albuterol (PROVENTIL HFA,VENTOLIN HFA) 90 mcg/act inhaler Inhale 2 puffs every 6 (six) hours as needed      atorvastatin (LIPITOR) 10 mg tablet Take 1 tablet by mouth daily      Cholecalciferol (VITAMIN D3) 12116 units TABS Take by mouth      cyanocobalamin 1000 MCG tablet Take 100 mcg by mouth daily      docusate sodium (COLACE) 100 mg capsule Take 1 capsule (100 mg total) by mouth 2 (two) times a day 10 capsule 0    DULoxetine (CYMBALTA) 60 mg delayed release capsule Take 60 mg by mouth daily       fluticasone (FLONASE) 50 mcg/act nasal spray 2 sprays into each nostril daily      levothyroxine 75 mcg tablet Take 75 mcg by mouth daily in the early morning       linaCLOtide (Linzess) 290 MCG CAPS Take 1 capsule by mouth daily 30 capsule 2    lisinopril-hydrochlorothiazide (PRINZIDE,ZESTORETIC) 10-12 5 MG per tablet Take 1 tablet by mouth daily      loratadine-pseudoephedrine (CLARITIN-D 12-HOUR) 5-120 mg per tablet Take 1 tablet by mouth 2 (two) times a day      montelukast (SINGULAIR) 10 mg tablet Take 10 mg by mouth daily at bedtime      Probiotic Product (PROBIOTIC ADVANCED PO) Take by mouth      SYMBICORT 160-4 5 MCG/ACT inhaler Inhale 2 puffs 2 (two) times a day  12    clotrimazole-betamethasone (LOTRISONE) 1-0 05 % cream Use  under breasts  Twice daily 45 g 3     No current facility-administered medications for this visit  She is allergic to azithromycin, hyoscyamine, sulfamethoxazole, and trimethoprim       Review of Systems:  All systems normal except as noted in HPI          Objective:  /72 (BP Location: Left arm, Patient Position: Sitting, Cuff Size: Standard)   Ht 5' (1 524 m)   Wt 82 6 kg (182 lb)   BMI 35 54 kg/m²    Physical Exam  Vitals and nursing note reviewed  Constitutional:       Appearance: Normal appearance  HENT:      Head: Normocephalic  Cardiovascular:      Rate and Rhythm: Normal rate and regular rhythm  Pulses: Normal pulses  Heart sounds: Normal heart sounds  Pulmonary:      Effort: Pulmonary effort is normal       Breath sounds: Normal breath sounds  Chest:      Chest wall: No mass, lacerations, swelling, tenderness or edema  Breasts: Juvenal Score is 4  Breasts are symmetrical          Right: Normal  No swelling, bleeding, inverted nipple, mass, nipple discharge, skin change or tenderness  Left: No swelling, bleeding, inverted nipple, mass, nipple discharge, skin change or tenderness  Comments: Pt  w hypertrophy of  Breasts  B/l   assymetry      Abdominal:      General: Abdomen is flat  Bowel sounds are normal       Palpations: Abdomen is soft  Genitourinary:     General: Normal vulva  Exam position: Lithotomy position  Pubic Area: No rash  Juvenal stage (genital): 4       Labia:         Right: No rash, tenderness or lesion           Left: No rash, tenderness or lesion  Urethra: No urethral pain, urethral swelling or urethral lesion  Vagina: Normal       Cervix: No cervical motion tenderness or discharge  Uterus: Normal        Adnexa: Right adnexa normal and left adnexa normal       Rectum: Normal    Musculoskeletal:         General: Normal range of motion  Cervical back: Neck supple  Lymphadenopathy:      Upper Body:      Right upper body: No supraclavicular, axillary or pectoral adenopathy  Left upper body: No supraclavicular, axillary or pectoral adenopathy  Lower Body: No right inguinal adenopathy  No left inguinal adenopathy  Skin:     General: Skin is warm and dry  Neurological:      General: No focal deficit present  Mental Status: She is alert and oriented to person, place, and time     Psychiatric:         Mood and Affect: Mood normal

## 2021-09-13 NOTE — PROGRESS NOTES
[facility] - Outpatient Follow-up Note  Ludy Becerra 49 y o  female MRN: [id number]  Encounter: [id number]    ASSESSMENT AND PLAN:      1  Irritable bowel syndrome with constipation  Presently symptoms controlled with fiber supplements and Linzess  I would continue indefinitely with both  - linaCLOtide (Linzess) [address]; Take 1 capsule by mouth daily  Dispense: 30 capsule; Refill: 2    2  History of colon polyps  Up-to-date on colonoscopy due for follow-up 2025    3  Gastroesophageal reflux disease without esophagitis  Asymptomatic  I asked her to contact us if her dysphagia gets worse in which case we would consider EGD      Followup Appointment:  1 year  ______________________________________________________________________    Chief Complaint   Patient presents with    Irritable Bowel Syndrome     follow up      HPI:  Patient is a very pleasant 81-year-old female we follow with a history of irritable bowel syndrome also history of polyps and acid reflux generally doing well on fiber supplements and Linzess with respect to her irritable bowel  Moving her bowels regularly  No significant heartburn occasional dysphagia for solid foods which is transient  Has not been progressive      Historical Information   Past Medical History:   Diagnosis Date    Asthma     Colon polyps     Deep vein thrombosis (DVT) (HCC)     Depression     Factor V Leiden (HCC)     Fibromyalgia     GERD (gastroesophageal reflux disease)     Hypercholesteremia     Hypertension     Hypothyroid     IBS (irritable bowel syndrome)     Migraine     Obesity     Papanicolaou smear 04/06/2018    Uterine leiomyoma      Past Surgical History:   Procedure Laterality Date    COLONOSCOPY  12/15/2017    COLONOSCOPY  01/31/2020    EGD  04/13/2012    ENDOMETRIAL BIOPSY  03/2019    FOOT SURGERY  1998    [facility]    SINUS SURGERY  2002     Social History Substance and Sexual Activity   Alcohol Use No     Social History     Substance and Sexual Activity   Drug Use No     Social History     Tobacco Use   Smoking Status Never Smoker   Smokeless Tobacco Never Used     Family History   Problem Relation Age of Onset    Diabetes Mother     Hypertension Mother     Stroke Mother     Diabetes Father     Hypertension Father     Alzheimer's disease Father     Heart disease Father     Heart attack Father     Diabetes Sister     Polycystic ovary syndrome Sister     Thyroid disease Sister     Breast cancer Maternal Aunt     Colon cancer Neg Hx     Colon polyps Neg Hx          Current Outpatient Medications:     albuterol (PROVENTIL HFA,VENTOLIN HFA) 90 mcg/act inhaler    atorvastatin (LIPITOR) 10 mg tablet    Cholecalciferol (VITAMIN D3) 38945 units TABS    cyanocobalamin 1000 MCG tablet    DULoxetine (CYMBALTA) 60 mg delayed release capsule    fluticasone (FLONASE) 50 mcg/act nasal spray    levothyroxine 75 mcg tablet    linaCLOtide (Linzess) 290 MCG CAPS    lisinopril-hydrochlorothiazide (PRINZIDE,ZESTORETIC) 10-12 5 MG per tablet    loratadine-pseudoephedrine (CLARITIN-D 12-HOUR) 5-120 mg per tablet    montelukast (SINGULAIR) 10 mg tablet    Probiotic Product (PROBIOTIC ADVANCED PO)    SYMBICORT 160-4 5 MCG/ACT inhaler    docusate sodium (COLACE) 100 mg capsule  Allergies   Allergen Reactions    Azithromycin Rash    Hyoscyamine Rash    Sulfamethoxazole Rash    Trimethoprim Rash     Reviewed medications and allergies and updated as indicated    PHYSICAL EXAM:    Blood pressure 120/82, height 5' (1 524 m), weight 82 1 kg (181 lb)  Body mass index is 35 35 kg/m²  General Appearance: NAD, cooperative, alert  Eyes: Anicteric, PERRLA, EOMI  ENT:  Normocephalic, atraumatic, normal mucosa      Neck:  Supple, symmetrical, trachea midline  Resp:  Clear to auscultation bilaterally; no rales, rhonchi or wheezing; respirations unlabored   CV:  S1 S2, Regular rate and rhythm; no murmur, rub, or gallop  GI:  Soft, non-tender, non-distended; normal bowel sounds; no masses, no organomegaly   Rectal: Deferred  Musculoskeletal: No cyanosis, clubbing or edema  Normal ROM  Skin:  No jaundice, rashes, or lesions   Heme/Lymph: No palpable cervical lymphadenopathy  Psych: Normal affect, good eye contact  Neuro: No gross deficits, AAOx3    Lab Results:   No results found for: WBC, HGB, HCT, MCV, PLT  No results found for: NA, K, CL, CO2, ANIONGAP, BUN, CREATININE, GLUCOSE, GLUF, CALCIUM, CORRECTEDCA, AST, ALT, ALKPHOS, PROT, BILITOT, EGFR  No results found for: IRON, TIBC, FERRITIN  No results found for: LIPASE    Radiology Results:   No results found

## 2021-09-17 LAB
CYTOLOGIST CVX/VAG CYTO: NORMAL
DX ICD CODE: NORMAL
HPV I/H RISK 4 DNA CVX QL PROBE+SIG AMP: NEGATIVE
OTHER STN SPEC: NORMAL
PATH REPORT.FINAL DX SPEC: NORMAL
SL AMB NOTE:: NORMAL
SL AMB SPECIMEN ADEQUACY: NORMAL
SL AMB TEST METHODOLOGY: NORMAL

## 2021-12-10 ENCOUNTER — TELEPHONE (OUTPATIENT)
Dept: OBGYN CLINIC | Facility: CLINIC | Age: 56
End: 2021-12-10

## 2021-12-13 ENCOUNTER — OFFICE VISIT (OUTPATIENT)
Dept: OBGYN CLINIC | Facility: CLINIC | Age: 56
End: 2021-12-13
Payer: COMMERCIAL

## 2021-12-13 VITALS
SYSTOLIC BLOOD PRESSURE: 130 MMHG | DIASTOLIC BLOOD PRESSURE: 90 MMHG | HEIGHT: 60 IN | WEIGHT: 178 LBS | BODY MASS INDEX: 34.95 KG/M2

## 2021-12-13 DIAGNOSIS — D21.9 FIBROIDS: Primary | ICD-10-CM

## 2021-12-13 DIAGNOSIS — N84.1 CERVICAL POLYP: ICD-10-CM

## 2021-12-13 PROCEDURE — 99213 OFFICE O/P EST LOW 20 MIN: CPT | Performed by: OBSTETRICS & GYNECOLOGY

## 2021-12-17 LAB
PATH REPORT.SITE OF ORIGIN SPEC: NORMAL
PAYMENT PROCEDURE: NORMAL
SL AMB .: NORMAL

## 2021-12-20 ENCOUNTER — TELEPHONE (OUTPATIENT)
Dept: OBGYN CLINIC | Facility: CLINIC | Age: 56
End: 2021-12-20

## 2021-12-28 DIAGNOSIS — K58.1 IRRITABLE BOWEL SYNDROME WITH CONSTIPATION: ICD-10-CM

## 2021-12-28 RX ORDER — LINACLOTIDE 290 UG/1
CAPSULE, GELATIN COATED ORAL
Qty: 30 CAPSULE | Refills: 2 | Status: SHIPPED | OUTPATIENT
Start: 2021-12-28 | End: 2022-03-23

## 2022-03-22 DIAGNOSIS — K58.1 IRRITABLE BOWEL SYNDROME WITH CONSTIPATION: ICD-10-CM

## 2022-03-23 RX ORDER — LINACLOTIDE 290 UG/1
CAPSULE, GELATIN COATED ORAL
Qty: 30 CAPSULE | Refills: 2 | Status: SHIPPED | OUTPATIENT
Start: 2022-03-23 | End: 2022-06-16

## 2022-06-16 DIAGNOSIS — K58.1 IRRITABLE BOWEL SYNDROME WITH CONSTIPATION: ICD-10-CM

## 2022-06-16 RX ORDER — LINACLOTIDE 290 UG/1
CAPSULE, GELATIN COATED ORAL
Qty: 30 CAPSULE | Refills: 2 | Status: SHIPPED | OUTPATIENT
Start: 2022-06-16

## 2022-08-28 ENCOUNTER — TELEPHONE (OUTPATIENT)
Dept: OTHER | Facility: OTHER | Age: 57
End: 2022-08-28

## 2022-08-29 NOTE — TELEPHONE ENCOUNTER
Pt calling cancelled appointment by mistake for 8/31/2022 at 4:30 pm  Patient would like to keep the approximant

## 2022-08-31 ENCOUNTER — OFFICE VISIT (OUTPATIENT)
Dept: GASTROENTEROLOGY | Facility: CLINIC | Age: 57
End: 2022-08-31
Payer: COMMERCIAL

## 2022-08-31 ENCOUNTER — TELEPHONE (OUTPATIENT)
Dept: GASTROENTEROLOGY | Facility: CLINIC | Age: 57
End: 2022-08-31

## 2022-08-31 VITALS
DIASTOLIC BLOOD PRESSURE: 82 MMHG | BODY MASS INDEX: 35.46 KG/M2 | WEIGHT: 180.6 LBS | HEIGHT: 60 IN | SYSTOLIC BLOOD PRESSURE: 120 MMHG

## 2022-08-31 DIAGNOSIS — K21.9 GASTROESOPHAGEAL REFLUX DISEASE WITHOUT ESOPHAGITIS: ICD-10-CM

## 2022-08-31 DIAGNOSIS — K58.1 IRRITABLE BOWEL SYNDROME WITH CONSTIPATION: Primary | ICD-10-CM

## 2022-08-31 DIAGNOSIS — Z86.010 HISTORY OF COLON POLYPS: ICD-10-CM

## 2022-08-31 DIAGNOSIS — R13.19 ESOPHAGEAL DYSPHAGIA: ICD-10-CM

## 2022-08-31 PROCEDURE — 99214 OFFICE O/P EST MOD 30 MIN: CPT | Performed by: INTERNAL MEDICINE

## 2022-08-31 RX ORDER — AMOXICILLIN AND CLAVULANATE POTASSIUM 875; 125 MG/1; MG/1
1 TABLET, FILM COATED ORAL EVERY 12 HOURS SCHEDULED
COMMUNITY
End: 2022-10-26

## 2022-08-31 RX ORDER — LISINOPRIL AND HYDROCHLOROTHIAZIDE 25; 20 MG/1; MG/1
1 TABLET ORAL DAILY
COMMUNITY
Start: 2022-08-08

## 2022-08-31 RX ORDER — PANTOPRAZOLE SODIUM 40 MG/1
40 TABLET, DELAYED RELEASE ORAL DAILY
COMMUNITY
Start: 2022-08-26

## 2022-08-31 NOTE — PROGRESS NOTES
[id number] - Outpatient Follow-up Note  Micheal Patel 56 y o  female MRN: [id number]  Encounter: [id number]    ASSESSMENT AND PLAN:      1  Irritable bowel syndrome with constipation  History of this generally doing well on Linzess  2  Gastroesophageal reflux disease without esophagitis  I believe she is having worsening symptoms of reflux Protonix has helped her  Given this as well as the dysphagia plan to perform EGD  - EGD; Future    3  History of colon polyps  Last colonoscopy 2025 year recall recommended    4  Esophageal dysphagia  EGD with possible dilatation planned  - EGD; Future      Followup Appointment:  3 months  ______________________________________________________________________    Chief Complaint   Patient presents with    Follow-up     IBS    GERD     HPI:  Patient is seen in the office in follow-up  She is a very pleasant 82-year-old female with a history of acid reflux and irritable bowel  She does report that over the last few months she has had trouble swallowing and over the last few weeks some soreness in the back of her throat with a change in voice  She was treated with both Augmentin for a sinusitis and also pantoprazole which helped her symptoms  She also reports some trouble swallowing solid foods and some reflux of sour fluid better with the Protonix but not completely resolved  She moves her bowels well every 3rd day or so with Linzess      Historical Information   Past Medical History:   Diagnosis Date    Asthma     Clotting disorder ([facility] ) 2009    Factor V    Colon polyps     [facility])     Depression     Factor V Leiden ([facility] )     Fibromyalgia     GERD (gastroesophageal reflux disease)     Hypercholesteremia     Hypertension     Hypothyroid     IBS (irritable bowel syndrome)     Migraine     Obesity     Papanicolaou smear 04/06/2018    Uterine leiomyoma      Past Surgical History:   Procedure Laterality Date    COLONOSCOPY  12/15/2017    COLONOSCOPY  01/31/2020    EGD  04/13/2012    ENDOMETRIAL BIOPSY  03/2019    FOOT SURGERY  1998    MAMMO (HISTORICAL)  08/01/2020    [facility]    SINUS SURGERY  2002     Social History     Substance and Sexual Activity   Alcohol Use No     Social History     Substance and Sexual Activity   Drug Use No     Social History     Tobacco Use   Smoking Status Never Smoker   Smokeless Tobacco Never Used     Family History   Problem Relation Age of Onset    Diabetes Mother     Hypertension Mother     Stroke Mother    Clifm Ari Diabetes Father     Hypertension Father     Alzheimer's disease Father     Heart disease Father         Congestive Heart Failure    Heart attack Father     Diabetes Sister     Polycystic ovary syndrome Sister     Thyroid disease Sister     Breast cancer Maternal Aunt     Colon cancer Neg Hx     Colon polyps Neg Hx          Current Outpatient Medications:     albuterol (PROVENTIL HFA,VENTOLIN HFA) 90 mcg/act inhaler    amoxicillin-clavulanate (AUGMENTIN) 875-125 mg per tablet    atorvastatin (LIPITOR) 10 mg tablet    Cholecalciferol (VITAMIN D3) 25599 units TABS    clotrimazole-betamethasone (LOTRISONE) 1-0 05 % cream    cyanocobalamin 1000 MCG tablet    docusate sodium (COLACE) 100 mg capsule    DULoxetine (CYMBALTA) 60 mg delayed release capsule    fluticasone (FLONASE) 50 mcg/act nasal spray    levothyroxine 75 mcg tablet    Linzess 290 MCG CAPS    lisinopril-hydrochlorothiazide (PRINZIDE,ZESTORETIC) 20-25 MG per tablet    loratadine-pseudoephedrine (CLARITIN-D 12-HOUR) 5-120 mg per tablet    montelukast (SINGULAIR) 10 mg tablet    pantoprazole (PROTONIX) 40 mg tablet    Probiotic Product (PROBIOTIC ADVANCED PO)    SYMBICORT 160-4 5 MCG/ACT inhaler    lisinopril-hydrochlorothiazide (PRINZIDE,ZESTORETIC) 10-12 5 MG per tablet  Allergies   Allergen Reactions    Azithromycin Rash    Hyoscyamine Rash    Sulfamethoxazole Rash    Trimethoprim Rash     Reviewed medications and allergies and updated as indicated    PHYSICAL EXAM:    Blood pressure 120/82, height 5' (1 524 m), weight 81 9 kg (180 lb 9 6 oz)  Body mass index is 35 27 kg/m²  General Appearance: NAD, cooperative, alert  Eyes: Anicteric, PERRLA, EOMI  ENT:  Normocephalic, atraumatic, normal mucosa  Neck:  Supple, symmetrical, trachea midline  Resp:  Clear to auscultation bilaterally; no rales, rhonchi or wheezing; respirations unlabored   CV:  S1 S2, Regular rate and rhythm; no murmur, rub, or gallop  GI:  Soft, non-tender, non-distended; normal bowel sounds; no masses, no organomegaly   Rectal: Deferred  Musculoskeletal: No cyanosis, clubbing or edema  Normal ROM  Skin:  No jaundice, rashes, or lesions   Heme/Lymph: No palpable cervical lymphadenopathy  Psych: Normal affect, good eye contact  Neuro: No gross deficits, AAOx3    Lab Results:   No results found for: WBC, HGB, HCT, MCV, PLT  No results found for: NA, K, CL, CO2, ANIONGAP, BUN, CREATININE, GLUCOSE, GLUF, CALCIUM, CORRECTEDCA, AST, ALT, ALKPHOS, PROT, BILITOT, EGFR  No results found for: IRON, TIBC, FERRITIN  No results found for: LIPASE    Radiology Results:   No results found

## 2022-08-31 NOTE — TELEPHONE ENCOUNTER
Scheduled date of EGD(as of today):10/26/22  Physician performing EGD:Dr Hester  Location of EGD:[facility]  Instructions reviewed with patient by:Milagro PIKE  Clearances: N

## 2022-09-10 DIAGNOSIS — K58.1 IRRITABLE BOWEL SYNDROME WITH CONSTIPATION: ICD-10-CM

## 2022-09-10 RX ORDER — LINACLOTIDE 290 UG/1
CAPSULE, GELATIN COATED ORAL
Qty: 30 CAPSULE | Refills: 2 | Status: SHIPPED | OUTPATIENT
Start: 2022-09-10

## 2022-10-24 ENCOUNTER — TELEPHONE (OUTPATIENT)
Dept: GASTROENTEROLOGY | Facility: AMBULATORY SURGERY CENTER | Age: 57
End: 2022-10-24

## 2022-10-25 ENCOUNTER — ANESTHESIA EVENT (OUTPATIENT)
Dept: ANESTHESIOLOGY | Facility: AMBULATORY SURGERY CENTER | Age: 57
End: 2022-10-25

## 2022-10-25 ENCOUNTER — ANESTHESIA (OUTPATIENT)
Dept: ANESTHESIOLOGY | Facility: AMBULATORY SURGERY CENTER | Age: 57
End: 2022-10-25

## 2022-10-25 NOTE — ANESTHESIA PREPROCEDURE EVALUATION
Procedure:  PRE-OP ONLY    Relevant Problems   No relevant active problems     Asthma Hypertension   Depression Fibromyalgia   GERD (gastroesophageal reflux disease) Factor V Leiden (HCC)   Hypercholesteremia Hypothyroid   IBS (irritable bowel syndrome) Migraine   Obesity Deep vein thrombosis (DVT) (HCC)   Uterine leiomyoma Colon polyps   Papanicolaou smear Clotting disorder (HCC)               Anesthesia Plan  ASA Score- 3     Anesthesia Type- IV sedation with anesthesia with ASA Monitors  Additional Monitors:   Airway Plan:           Plan Factors-    Chart reviewed  Patient is not a current smoker  Induction- intravenous  Postoperative Plan-     Informed Consent- Anesthetic plan and risks discussed with patient  I personally reviewed this patient with the CRNA  Discussed and agreed on the Anesthesia Plan with the CRNA  Sasha Stephenson

## 2022-10-26 ENCOUNTER — ANESTHESIA (OUTPATIENT)
Dept: GASTROENTEROLOGY | Facility: AMBULATORY SURGERY CENTER | Age: 57
End: 2022-10-26

## 2022-10-26 ENCOUNTER — HOSPITAL ENCOUNTER (OUTPATIENT)
Dept: GASTROENTEROLOGY | Facility: AMBULATORY SURGERY CENTER | Age: 57
Discharge: HOME | End: 2022-10-26

## 2022-10-26 ENCOUNTER — ANESTHESIA EVENT (OUTPATIENT)
Dept: GASTROENTEROLOGY | Facility: AMBULATORY SURGERY CENTER | Age: 57
End: 2022-10-26

## 2022-10-26 VITALS
OXYGEN SATURATION: 97 % | SYSTOLIC BLOOD PRESSURE: 122 MMHG | HEART RATE: 92 BPM | HEIGHT: 60 IN | WEIGHT: 180 LBS | DIASTOLIC BLOOD PRESSURE: 73 MMHG | RESPIRATION RATE: 16 BRPM | BODY MASS INDEX: 35.34 KG/M2 | TEMPERATURE: 97.1 F

## 2022-10-26 DIAGNOSIS — K21.9 GASTROESOPHAGEAL REFLUX DISEASE WITHOUT ESOPHAGITIS: ICD-10-CM

## 2022-10-26 DIAGNOSIS — R13.19 ESOPHAGEAL DYSPHAGIA: ICD-10-CM

## 2022-10-26 RX ORDER — LIDOCAINE HYDROCHLORIDE 10 MG/ML
INJECTION, SOLUTION EPIDURAL; INFILTRATION; INTRACAUDAL; PERINEURAL AS NEEDED
Status: DISCONTINUED | OUTPATIENT
Start: 2022-10-26 | End: 2022-10-26

## 2022-10-26 RX ORDER — PROPOFOL 10 MG/ML
INJECTION, EMULSION INTRAVENOUS AS NEEDED
Status: DISCONTINUED | OUTPATIENT
Start: 2022-10-26 | End: 2022-10-26

## 2022-10-26 RX ORDER — SODIUM CHLORIDE 9 MG/ML
50 INJECTION, SOLUTION INTRAVENOUS CONTINUOUS
Status: DISCONTINUED | OUTPATIENT
Start: 2022-10-26 | End: 2022-10-30 | Stop reason: HOSPADM

## 2022-10-26 RX ADMIN — PROPOFOL 70 MG: 10 INJECTION, EMULSION INTRAVENOUS at 09:56

## 2022-10-26 RX ADMIN — PROPOFOL 100 MG: 10 INJECTION, EMULSION INTRAVENOUS at 09:54

## 2022-10-26 RX ADMIN — PROPOFOL 50 MG: 10 INJECTION, EMULSION INTRAVENOUS at 10:00

## 2022-10-26 RX ADMIN — SODIUM CHLORIDE 50 ML/HR: 9 INJECTION, SOLUTION INTRAVENOUS at 09:24

## 2022-10-26 RX ADMIN — PROPOFOL 30 MG: 10 INJECTION, EMULSION INTRAVENOUS at 09:57

## 2022-10-26 RX ADMIN — LIDOCAINE HYDROCHLORIDE 50 MG: 10 INJECTION, SOLUTION EPIDURAL; INFILTRATION; INTRACAUDAL; PERINEURAL at 09:54

## 2022-10-26 RX ADMIN — PROPOFOL 50 MG: 10 INJECTION, EMULSION INTRAVENOUS at 10:02

## 2022-10-26 RX ADMIN — PROPOFOL 50 MG: 10 INJECTION, EMULSION INTRAVENOUS at 10:03

## 2022-10-26 RX ADMIN — PROPOFOL 50 MG: 10 INJECTION, EMULSION INTRAVENOUS at 09:58

## 2022-10-26 NOTE — ANESTHESIA PREPROCEDURE EVALUATION
Procedure:  EGD    Relevant Problems   No relevant active problems     Asthma Hypertension   Depression Fibromyalgia   GERD (gastroesophageal reflux disease) Factor V Leiden (HCC)   Hypercholesteremia Hypothyroid   IBS (irritable bowel syndrome) Migraine   Obesity Deep vein thrombosis (DVT) (HCC)   Uterine leiomyoma Colon polyps   Papanicolaou smear Clotting disorder ([address] )   Asthma-Stable on routine Singulair  On no blood thinners  DVT 2009     Physical Exam    Airway    Mallampati score: II  TM Distance: >3 FB  Neck ROM: full     Dental   No notable dental hx     Cardiovascular      Pulmonary  Breath sounds clear to auscultation,     Other Findings        Anesthesia Plan  ASA Score- 2     Anesthesia Type- IV sedation with anesthesia with ASA Monitors  Additional Monitors:   Airway Plan:           Plan Factors-Exercise tolerance (METS): >4 METS  Chart reviewed  Patient is not a current smoker  Induction- intravenous  Postoperative Plan-     Informed Consent- Anesthetic plan and risks discussed with patient  I personally reviewed this patient with the CRNA  Discussed and agreed on the Anesthesia Plan with the CRNA  Sawyer Wiley

## 2022-10-26 NOTE — H&P
History and Physical - [facility]  Amador Kohler 56 y o  female MRN: [id number]    HPI: Amador Kohler is a 56y o  year old female who presents for EGD for dysphagia    REVIEW OF SYSTEMS: Per the HPI, and otherwise unremarkable      Historical Information   Past Medical History:   Diagnosis Date   • Asthma    • Clotting disorder ([address] ) 2009    Factor V   • Colon polyps    • Deep vein thrombosis (DVT) (HCC)    • Depression    • Disease of thyroid gland    • Factor V Leiden (HCC)    • Fibromyalgia    • GERD (gastroesophageal reflux disease)    • Hypercholesteremia    • Hypertension    • Hypothyroid    • IBS (irritable bowel syndrome)    • Migraine    • Obesity    • Papanicolaou smear 04/06/2018   • Uterine leiomyoma      Past Surgical History:   Procedure Laterality Date   • COLONOSCOPY  12/15/2017   • COLONOSCOPY  01/31/2020   • EGD  04/13/2012   • ENDOMETRIAL BIOPSY  03/2019   • FOOT SURGERY  1998   • MAMMO (HISTORICAL)  08/01/2020    [facility]   • SINUS SURGERY  2002     Social History   Social History     Substance and Sexual Activity   Alcohol Use No     Social History     Substance and Sexual Activity   Drug Use No     Social History     Tobacco Use   Smoking Status Never Smoker   Smokeless Tobacco Never Used     Family History   Problem Relation Age of Onset   • Diabetes Mother    • Hypertension Mother    • Stroke Mother    • Diabetes Father    • Hypertension Father    • Alzheimer's disease Father    • Heart disease Father         Congestive Heart Failure   • Heart attack Father    • Diabetes Sister    • Polycystic ovary syndrome Sister    • Thyroid disease Sister    • Breast cancer Maternal Aunt    • Colon cancer Neg Hx    • Colon polyps Neg Hx        Meds/Allergies       Current Outpatient Medications:   •  atorvastatin (LIPITOR) 10 mg tablet  •  Cholecalciferol (VITAMIN D3) 91785 units TABS  •  cyanocobalamin 1000 MCG tablet  •  DULoxetine (CYMBALTA) 60 mg delayed release capsule  • fluticasone (FLONASE) 50 mcg/act nasal spray  •  levothyroxine 75 mcg tablet  •  Linzess 290 MCG CAPS  •  lisinopril-hydrochlorothiazide (PRINZIDE,ZESTORETIC) 20-25 MG per tablet  •  loratadine-pseudoephedrine (CLARITIN-D 12-HOUR) 5-120 mg per tablet  •  montelukast (SINGULAIR) 10 mg tablet  •  pantoprazole (PROTONIX) 40 mg tablet  •  Probiotic Product (PROBIOTIC ADVANCED PO)  •  SYMBICORT 160-4 5 MCG/ACT inhaler  •  albuterol (PROVENTIL HFA,VENTOLIN HFA) 90 mcg/act inhaler    Current Facility-Administered Medications:   •  sodium chloride 0 9 % infusion, 50 mL/hr, Intravenous, Continuous, 50 mL/hr at 10/26/22 9362    Allergies   Allergen Reactions   • Azithromycin Rash   • Hyoscyamine Rash   • Sulfamethoxazole Rash   • Trimethoprim Rash       Objective     /73   Pulse 95   Temp (!) 97 1 °F (36 2 °C) (Temporal)   Resp 18   Ht 4' 11 5" (1 511 m)   Wt 81 6 kg (180 lb)   SpO2 98%   BMI 35 75 kg/m²     PHYSICAL EXAM    Gen: NAD AAOx3  Head: Normocephalic, Atraumatic  CV: S1S2 RRR no m/r/g  CHEST: Clear b/l no c/r/w  ABD: soft, +BS NT/ND  EXT: no edema    ASSESSMENT/PLAN:  This is a 56y o  year old female here for EGD, and she is stable and optimized for her procedure

## 2022-10-26 NOTE — ANESTHESIA POSTPROCEDURE EVALUATION
Post-Op Assessment Note    CV Status:  Stable  Pain Score: 0    Pain management: adequate     Mental Status:  Awake   Hydration Status:  Stable   PONV Controlled:  None   Airway Patency:  Patent      Post Op Vitals Reviewed: Yes      Staff: CRNA         No complications documented      BP   119/63   Temp     Pulse  102   Resp  20    SpO2   99%

## 2022-10-27 ENCOUNTER — ANNUAL EXAM (OUTPATIENT)
Dept: OBGYN CLINIC | Facility: CLINIC | Age: 57
End: 2022-10-27

## 2022-10-27 VITALS
HEIGHT: 60 IN | DIASTOLIC BLOOD PRESSURE: 80 MMHG | WEIGHT: 182.2 LBS | SYSTOLIC BLOOD PRESSURE: 140 MMHG | BODY MASS INDEX: 35.77 KG/M2

## 2022-10-27 DIAGNOSIS — Z12.31 ENCOUNTER FOR SCREENING MAMMOGRAM FOR MALIGNANT NEOPLASM OF BREAST: ICD-10-CM

## 2022-10-27 DIAGNOSIS — Z01.419 ENCOUNTER FOR GYNECOLOGICAL EXAMINATION WITHOUT ABNORMAL FINDING: Primary | ICD-10-CM

## 2022-10-27 NOTE — PROGRESS NOTES
Assessment/Plan:  Calcium 1200-1500mg + 600-1000 IU Vit D daily  Exercise  including weight bearing exercises  Pap with high risk HPV Q 5 years  Annual mammogram and monthly breast self exam recommended  Colonoscopy-Up to date          Diagnoses and all orders for this visit:    Encounter for gynecological examination without abnormal finding    Encounter for screening mammogram for malignant neoplasm of breast  -     Mammo screening bilateral w 3d & cad; Future          Subjective:      Patient ID: Melanie Oconnell is a 56 y o  female  Here for annual gyn G0 Factor V  x 3 years [id number] neg/neg Colonoscopy UTD repeat 2025 No concerns  Denies vaginal bleeding  Denies abdominal or pelvic pain  Bowel and bladder are nromal       The following portions of the patient's history were reviewed and updated as appropriate: allergies, current medications, past family history, past medical history, past social history, past surgical history and problem list     Review of Systems   Constitutional: Negative for fatigue and unexpected weight change  Gastrointestinal: Negative for abdominal distention, abdominal pain, constipation and diarrhea  Genitourinary: Negative for difficulty urinating, dyspareunia, dysuria, frequency, genital sores, pelvic pain, urgency, vaginal bleeding, vaginal discharge and vaginal pain  Neurological: Negative for headaches  Psychiatric/Behavioral: Negative  Negative for dysphoric mood  The patient is not nervous/anxious  Objective:      /80 (BP Location: Left arm, Patient Position: Sitting, Cuff Size: Standard)   Ht 4' 11 5" (1 511 m)   Wt 82 6 kg (182 lb 3 2 oz)   BMI 36 18 kg/m²          Physical Exam  Vitals and nursing note reviewed  Constitutional:       General: She is not in acute distress  Appearance: Normal appearance  HENT:      Head: Normocephalic and atraumatic     Pulmonary:      Effort: Pulmonary effort is normal    Chest:   Breasts: Breasts are symmetrical       Right: Normal  No mass, nipple discharge, skin change, tenderness or axillary adenopathy  Left: Normal  No mass, nipple discharge, skin change, tenderness or axillary adenopathy  Abdominal:      General: There is no distension  Palpations: Abdomen is soft  Tenderness: There is no abdominal tenderness  There is no guarding or rebound  Genitourinary:     General: Normal vulva  Exam position: Lithotomy position  Labia:         Right: No rash, tenderness, lesion or injury  Left: No rash, tenderness, lesion or injury  Urethra: No prolapse, urethral pain, urethral swelling or urethral lesion  Vagina: Normal  No erythema or lesions  Cervix: No cervical motion tenderness, discharge, lesion or cervical bleeding  Uterus: Normal        Adnexa: Right adnexa normal and left adnexa normal         Right: No mass or tenderness  Left: No mass or tenderness  Rectum: No mass or external hemorrhoid  Musculoskeletal:         General: Normal range of motion  Lymphadenopathy:      Upper Body:      Right upper body: No axillary adenopathy  Left upper body: No axillary adenopathy  Lower Body: No right inguinal adenopathy  No left inguinal adenopathy  Skin:     General: Skin is warm and dry  Neurological:      Mental Status: She is alert and oriented to person, place, and time  Psychiatric:         Mood and Affect: Mood normal          Behavior: Behavior normal          Thought Content:  Thought content normal          Judgment: Judgment normal

## 2022-10-27 NOTE — PATIENT INSTRUCTIONS
Calcium 1200-1500mg + 600-1000 IU Vit D daily  Exercise  including weight bearing exercises  Pap with high risk HPV Q 5 years  Annual mammogram and monthly breast self exam recommended    Colonoscopy-Up to date

## 2022-12-06 ENCOUNTER — OFFICE VISIT (OUTPATIENT)
Dept: GASTROENTEROLOGY | Facility: CLINIC | Age: 57
End: 2022-12-06

## 2022-12-06 VITALS
HEIGHT: 60 IN | BODY MASS INDEX: 35.53 KG/M2 | SYSTOLIC BLOOD PRESSURE: 118 MMHG | DIASTOLIC BLOOD PRESSURE: 78 MMHG | WEIGHT: 181 LBS

## 2022-12-06 DIAGNOSIS — R13.19 ESOPHAGEAL DYSPHAGIA: ICD-10-CM

## 2022-12-06 DIAGNOSIS — K58.1 IRRITABLE BOWEL SYNDROME WITH CONSTIPATION: ICD-10-CM

## 2022-12-06 DIAGNOSIS — K21.9 GASTROESOPHAGEAL REFLUX DISEASE WITHOUT ESOPHAGITIS: Primary | ICD-10-CM

## 2022-12-06 DIAGNOSIS — K63.89 SMALL INTESTINAL BACTERIAL OVERGROWTH (SIBO): ICD-10-CM

## 2022-12-06 DIAGNOSIS — Z86.010 HISTORY OF COLON POLYPS: ICD-10-CM

## 2022-12-06 RX ORDER — AMOXICILLIN AND CLAVULANATE POTASSIUM 875; 125 MG/1; MG/1
1 TABLET, FILM COATED ORAL EVERY 12 HOURS SCHEDULED
Qty: 28 TABLET | Refills: 0 | Status: SHIPPED | OUTPATIENT
Start: 2022-12-06 | End: 2022-12-20

## 2022-12-06 NOTE — PROGRESS NOTES
[id number] - Outpatient Follow-up Note  Debbie Hairston 56 y o  female MRN: [id number]  Encounter: [id number]    ASSESSMENT AND PLAN:      1  Gastroesophageal reflux disease without esophagitis  History of this controlled with Protonix which I encouraged her to-explained to her the importance of eating an hour after takes it  2  Irritable bowel syndrome with constipation  Has had bacterial overgrowth in the past that is responded to antibiotics which I think can overlap with irritable bowel and have elected to treat her as below for 14 days    3  History of colon polyps  Last colonoscopy 2020  5-year recall    4  Esophageal dysphagia  Resolved status post dilatation    5  Small intestinal bacterial overgrowth (SIBO)  History of this we will give a trial of 14 days of Augmentin in light of the recent change in bowels  - amoxicillin-clavulanate (AUGMENTIN) 875-125 mg per tablet; Take 1 tablet by mouth every 12 (twelve) hours for 14 days  Dispense: 28 tablet; Refill: 0  - Celiac Disease Antibody Profile; Future  - Celiac Disease Antibody Profile      Followup Appointment: 6 months  ______________________________________________________________________    Chief Complaint   Patient presents with   • Follow-up     HPI: Some change in bowel habits alternating diarrhea constipation and some gas  Her swallowing is much better status post dilatation no heartburn as long she takes Protonix which she takes before dinner      Historical Information   Past Medical History:   Diagnosis Date   • Asthma    • [facility] ) 2009    Factor V   • Colon polyps    • Deep [facility] ([facility]T) ([facility]    • Depression    • Disease of thyroid gland    • Factor V Leiden ([facility])    • Fibromyalgia    • GERD (gastroesophageal reflux disease)    • Hypercholesteremia    • Hypertension    • Hypothyroid    • IBS (irritable bowel syndrome)    • Migraine    • Obesity    • Papanicolaou smear 04/06/2018   • Uterine leiomyoma      Past Surgical History:   Procedure Laterality Date   • COLONOSCOPY  12/15/2017   • COLONOSCOPY  01/31/2020   • EGD  04/13/2012   • ENDOMETRIAL BIOPSY  03/2019   • FOOT SURGERY  1998   • MAMMO (HISTORICAL)  08/01/2020    [facility]   • SINUS SURGERY  2002     Social History     Substance and Sexual Activity   Alcohol Use No     Social History     Substance and Sexual Activity   Drug Use No     Social History     Tobacco Use   Smoking Status Never   Smokeless Tobacco Never     Family History   Problem Relation Age of Onset   • Diabetes Mother    • Hypertension Mother    • Stroke Mother    • Diabetes Father    • Hypertension Father    • Alzheimer's disease Father    • Heart disease Father         Congestive Heart Failure   • Heart attack Father    • Diabetes Sister    • Polycystic ovary syndrome Sister    • Thyroid disease Sister    • Breast cancer Maternal Aunt    • Colon cancer Neg Hx    • Colon polyps Neg Hx          Current Outpatient Medications:   •  albuterol (PROVENTIL HFA,VENTOLIN HFA) 90 mcg/act inhaler  •  amoxicillin-clavulanate (AUGMENTIN) 875-125 mg per tablet  •  atorvastatin (LIPITOR) 10 mg tablet  •  Cholecalciferol (VITAMIN D3) 63145 units TABS  •  cyanocobalamin 1000 MCG tablet  •  DULoxetine (CYMBALTA) 60 mg delayed release capsule  •  fluticasone (FLONASE) 50 mcg/act nasal spray  •  levothyroxine 75 mcg tablet  •  Linzess 290 MCG CAPS  •  lisinopril-hydrochlorothiazide (PRINZIDE,ZESTORETIC) 20-25 MG per tablet  •  loratadine-pseudoephedrine (CLARITIN-D 12-HOUR) 5-120 mg per tablet  •  montelukast (SINGULAIR) 10 mg tablet  •  pantoprazole (PROTONIX) 40 mg tablet  •  Probiotic Product (PROBIOTIC ADVANCED PO)  •  SYMBICORT 160-4 5 MCG/ACT inhaler  Allergies   Allergen Reactions   • Azithromycin Rash   • Hyoscyamine Rash   • Sulfamethoxazole Rash   • Trimethoprim Rash     Reviewed medications and allergies and updated as indicated    PHYSICAL EXAM:    Blood pressure 118/78, height 4' 11 5" (1 511 m), weight 82 1 kg (181 lb)  Body mass index is 35 95 kg/m²  General Appearance: NAD, cooperative, alert  Eyes: Anicteric, PERRLA, EOMI  ENT:  Normocephalic, atraumatic, normal mucosa  Neck:  Supple, symmetrical, trachea midline  Resp:  Clear to auscultation bilaterally; no rales, rhonchi or wheezing; respirations unlabored   CV:  S1 S2, Regular rate and rhythm; no murmur, rub, or gallop  GI:  Soft, non-tender, non-distended; normal bowel sounds; no masses, no organomegaly   Rectal: Deferred  Musculoskeletal: No cyanosis, clubbing or edema  Normal ROM  Skin:  No jaundice, rashes, or lesions   Heme/Lymph: No palpable cervical lymphadenopathy  Psych: Normal affect, good eye contact  Neuro: No gross deficits, AAOx3    Lab Results:   No results found for: WBC, HGB, HCT, MCV, PLT  No results found for: NA, K, CL, CO2, ANIONGAP, BUN, CREATININE, GLUCOSE, GLUF, CALCIUM, CORRECTEDCA, AST, ALT, ALKPHOS, PROT, BILITOT, EGFR  No results found for: IRON, TIBC, FERRITIN  No results found for: LIPASE    Radiology Results:   No results found

## 2022-12-14 DIAGNOSIS — K58.1 IRRITABLE BOWEL SYNDROME WITH CONSTIPATION: ICD-10-CM

## 2022-12-14 RX ORDER — LINACLOTIDE 290 UG/1
CAPSULE, GELATIN COATED ORAL
Qty: 30 CAPSULE | Refills: 2 | Status: SHIPPED | OUTPATIENT
Start: 2022-12-14

## 2022-12-28 LAB
ENDOMYSIUM IGA SER QL: NEGATIVE
GLIADIN PEPTIDE IGA SER-ACNC: 2 UNITS (ref 0–19)
GLIADIN PEPTIDE IGG SER-ACNC: 2 UNITS (ref 0–19)
IGA SERPL-MCNC: 245 MG/DL (ref 87–352)
TTG IGA SER-ACNC: <2 U/ML (ref 0–3)
TTG IGG SER-ACNC: <2 U/ML (ref 0–5)

## 2023-03-09 DIAGNOSIS — K58.1 IRRITABLE BOWEL SYNDROME WITH CONSTIPATION: ICD-10-CM

## 2023-03-09 RX ORDER — LINACLOTIDE 290 UG/1
CAPSULE, GELATIN COATED ORAL
Qty: 30 CAPSULE | Refills: 2 | Status: SHIPPED | OUTPATIENT
Start: 2023-03-09

## 2023-06-10 DIAGNOSIS — K58.1 IRRITABLE BOWEL SYNDROME WITH CONSTIPATION: ICD-10-CM

## 2023-06-10 RX ORDER — LINACLOTIDE 290 UG/1
CAPSULE, GELATIN COATED ORAL
Qty: 30 CAPSULE | Refills: 2 | Status: SHIPPED | OUTPATIENT
Start: 2023-06-10

## 2023-09-21 DIAGNOSIS — K58.1 IRRITABLE BOWEL SYNDROME WITH CONSTIPATION: ICD-10-CM

## 2023-09-22 RX ORDER — LINACLOTIDE 290 UG/1
CAPSULE, GELATIN COATED ORAL
Qty: 30 CAPSULE | Refills: 2 | Status: SHIPPED | OUTPATIENT
Start: 2023-09-22

## 2023-12-18 DIAGNOSIS — K58.1 IRRITABLE BOWEL SYNDROME WITH CONSTIPATION: ICD-10-CM

## 2023-12-18 RX ORDER — LINACLOTIDE 290 UG/1
CAPSULE, GELATIN COATED ORAL
Qty: 30 CAPSULE | Refills: 0 | Status: SHIPPED | OUTPATIENT
Start: 2023-12-18

## 2023-12-27 DIAGNOSIS — K58.1 IRRITABLE BOWEL SYNDROME WITH CONSTIPATION: ICD-10-CM

## 2023-12-27 DIAGNOSIS — K21.9 GASTROESOPHAGEAL REFLUX DISEASE WITHOUT ESOPHAGITIS: Primary | ICD-10-CM

## 2023-12-29 NOTE — TELEPHONE ENCOUNTER
Please contact patient to schedule appt and we can then determine if a short refill can be given to last until the appt.  Thank you.

## 2024-01-08 NOTE — TELEPHONE ENCOUNTER
Patients GI provider:  Dr. Cabezas    Number to return call: [phone]    Reason for call: Please note that pt is sched for an OV w/Dr. Cabezas on 02/28/2024    Scheduled procedure/appointment date if applicable: Apt 02/28/2024

## 2024-01-17 DIAGNOSIS — K58.1 IRRITABLE BOWEL SYNDROME WITH CONSTIPATION: ICD-10-CM

## 2024-01-17 RX ORDER — LINACLOTIDE 290 UG/1
CAPSULE, GELATIN COATED ORAL
Qty: 30 CAPSULE | Refills: 5 | Status: SHIPPED | OUTPATIENT
Start: 2024-01-17

## 2024-02-02 RX ORDER — PANTOPRAZOLE SODIUM 40 MG/1
40 TABLET, DELAYED RELEASE ORAL DAILY
Qty: 30 TABLET | Refills: 0 | Status: SHIPPED | OUTPATIENT
Start: 2024-02-02

## 2024-02-02 RX ORDER — LINACLOTIDE 290 UG/1
290 CAPSULE, GELATIN COATED ORAL DAILY
Qty: 30 CAPSULE | Refills: 0 | Status: SHIPPED | OUTPATIENT
Start: 2024-02-02

## 2024-02-28 ENCOUNTER — OFFICE VISIT (OUTPATIENT)
Dept: GASTROENTEROLOGY | Facility: CLINIC | Age: 59
End: 2024-02-28
Payer: COMMERCIAL

## 2024-02-28 VITALS
SYSTOLIC BLOOD PRESSURE: 110 MMHG | HEIGHT: 60 IN | WEIGHT: 179.2 LBS | BODY MASS INDEX: 35.18 KG/M2 | DIASTOLIC BLOOD PRESSURE: 78 MMHG

## 2024-02-28 DIAGNOSIS — K63.8219 SMALL INTESTINAL BACTERIAL OVERGROWTH (SIBO): ICD-10-CM

## 2024-02-28 DIAGNOSIS — Z86.010 HISTORY OF COLON POLYPS: Primary | ICD-10-CM

## 2024-02-28 DIAGNOSIS — K58.1 IRRITABLE BOWEL SYNDROME WITH CONSTIPATION: ICD-10-CM

## 2024-02-28 DIAGNOSIS — R13.19 ESOPHAGEAL DYSPHAGIA: ICD-10-CM

## 2024-02-28 DIAGNOSIS — K21.9 GASTROESOPHAGEAL REFLUX DISEASE WITHOUT ESOPHAGITIS: ICD-10-CM

## 2024-02-28 PROCEDURE — 99214 OFFICE O/P EST MOD 30 MIN: CPT | Performed by: INTERNAL MEDICINE

## 2024-02-28 RX ORDER — FLUTICASONE FUROATE AND VILANTEROL TRIFENATATE 200; 25 UG/1; UG/1
POWDER RESPIRATORY (INHALATION)
COMMUNITY
Start: 2024-02-07

## 2024-02-28 RX ORDER — CHLORAL HYDRATE 500 MG
CAPSULE ORAL
COMMUNITY
Start: 2021-06-15

## 2024-02-28 RX ORDER — LINACLOTIDE 290 UG/1
290 CAPSULE, GELATIN COATED ORAL DAILY
Qty: 30 CAPSULE | Refills: 0 | Status: SHIPPED | OUTPATIENT
Start: 2024-02-28

## 2024-02-28 RX ORDER — PANTOPRAZOLE SODIUM 40 MG/1
40 TABLET, DELAYED RELEASE ORAL DAILY
Qty: 30 TABLET | Refills: 0 | Status: SHIPPED | OUTPATIENT
Start: 2024-02-28

## 2024-02-28 NOTE — PROGRESS NOTES
[facility] - Outpatient Follow-up Note  Odessase Alise Kumar 59 y.o. female MRN: [id number]  Encounter: [id number]    ASSESSMENT AND PLAN:      1. Gastroesophageal reflux disease without esophagitis  Stable continue pantoprazole I did recommend she take it in the morning.  We discussed lifestyle modifications  - pantoprazole (PROTONIX) 40 mg tablet; Take 1 tablet (40 mg total) by mouth daily  Dispense: 30 tablet; Refill: 0    2. Irritable bowel syndrome with constipation  Stable we discussed the benefits of activity and fiber  - linaCLOtide (Linzess) 290 MCG CAPS; Take 1 capsule by mouth daily  Dispense: 30 capsule; Refill: 0    3. History of colon polyps  Up-to-date last colonoscopy 2020.  5-year recall recommended    4. Esophageal dysphagia  History of this from an esophageal stricture presently not symptomatic    5. Small intestinal bacterial overgrowth (SIBO)  History of this not active      Followup Appointment: 1 year  ______________________________________________________________________    Chief Complaint   Patient presents with    Follow-up     Pt needs medication refills. She states her symptoms are under control with medications.      HPI: Patient is a very pleasant 59-year-old female we follow with a history of acid reflux.  Also a history of chronic constipation for which she takes Linzess.  Generally been doing okay moving her bowels about 3 times a week.  Some bloating.  Heartburn symptoms are generally controlled with pantoprazole but she has breakthrough symptoms perhaps once a week.  Usually when she eats late.  No dysphagia.  No alarm symptoms she does have vague left lower quadrant pain which has been attributed to uterine fibroids.  This is being followed by GYN.  The pain has no relationship to the GI cycle.    Historical Information   Past Medical History:   Diagnosis Date    Asthma     Clotting disorder (HCC) 2009    Factor V    Colon polyps     Deep  vein thrombosis (DVT) (HCC)     Depression     Disease of thyroid gland     Factor V Leiden (HCC)     Fibromyalgia     GERD (gastroesophageal reflux disease)     Hypercholesteremia     Hypertension     Hypothyroid     IBS (irritable bowel syndrome)     Migraine     Obesity     Papanicolaou smear 04/06/2018    Uterine leiomyoma      Past Surgical History:   Procedure Laterality Date    COLONOSCOPY  12/15/2017    COLONOSCOPY  01/31/2020    COLONOSCOPY  01/30/2020    EGD  04/13/2012    ENDOMETRIAL BIOPSY  03/2019    FOOT SURGERY  1998    MAMMO (HISTORICAL)  08/01/2020    [facility]    SINUS SURGERY  2002    UPPER GASTROINTESTINAL ENDOSCOPY       Social History     Substance and Sexual Activity   Alcohol Use No     Social History     Substance and Sexual Activity   Drug Use No     Social History     Tobacco Use   Smoking Status Never   Smokeless Tobacco Never     Family History   Problem Relation Age of Onset    Diabetes Mother     Hypertension Mother     Stroke Mother     Depression Mother     Diabetes Father     Hypertension Father     Alzheimer's disease Father     Heart disease Father         Congestive Heart Failure    Heart attack Father     Diabetes Sister     Polycystic ovary syndrome Sister     Thyroid disease Sister     Hypothyroidism Sister     Breast cancer Maternal Aunt     Colon cancer Neg Hx     Colon polyps Neg Hx          Current Outpatient Medications:     albuterol (PROVENTIL HFA,VENTOLIN HFA) 90 mcg/act inhaler    atorvastatin (LIPITOR) 10 mg tablet    Breo Ellipta 200-25 MCG/ACT inhaler    Cholecalciferol (VITAMIN D3) 45666 units TABS    cyanocobalamin 1000 MCG tablet    DULoxetine (CYMBALTA) 60 mg delayed release capsule    fluticasone (FLONASE) 50 mcg/act nasal spray    levothyroxine 75 mcg tablet    linaCLOtide (Linzess) 290 MCG CAPS    lisinopril-hydrochlorothiazide (PRINZIDE,ZESTORETIC) 20-25 MG per tablet    loratadine-pseudoephedrine (CLARITIN-D 12-HOUR) 5-120 mg per tablet    montelukast  "(SINGULAIR) 10 mg tablet    Omega-3 Fatty Acids (fish oil) 1,000 mg    pantoprazole (PROTONIX) 40 mg tablet    Probiotic Product (PROBIOTIC ADVANCED PO)    SYMBICORT 160-4.5 MCG/ACT inhaler  Allergies   Allergen Reactions    Azithromycin Rash    Hyoscyamine Rash    Sulfamethoxazole Rash    Trimethoprim Rash     Reviewed medications and allergies and updated as indicated    PHYSICAL EXAM:    Blood pressure 110/78, height 4' 11.5\" (1.511 m), weight 81.3 kg (179 lb 3.2 oz). Body mass index is 35.59 kg/m².  General Appearance: NAD, cooperative, alert  Eyes: Anicteric, conjunctiva pink  ENT:  Normocephalic, atraumatic, normal mucosa.    Neck:  Supple, symmetrical, trachea midline  Resp:  Clear to auscultation bilaterally; no rales, rhonchi or wheezing; respirations unlabored   CV:  S1 S2, Regular rate and rhythm; no murmur, rub, or gallop.  GI:  Soft, non-tender, non-distended; normal bowel sounds; no masses, no organomegaly   Rectal: Deferred  Musculoskeletal: No cyanosis, clubbing or edema. Normal ROM.  Skin:  No jaundice, rashes, or lesions   Heme/Lymph: No palpable cervical lymphadenopathy  Psych: Normal affect, good eye contact  Neuro: No gross deficits, AAOx3    Lab Results:   No results found for: \"WBC\", \"HGB\", \"HCT\", \"MCV\", \"PLT\"  No results found for: \"NA\", \"K\", \"CL\", \"CO2\", \"ANIONGAP\", \"BUN\", \"CREATININE\", \"GLUCOSE\", \"GLUF\", \"CALCIUM\", \"CORRECTEDCA\", \"AST\", \"ALT\", \"ALKPHOS\", \"PROT\", \"BILITOT\", \"EGFR\"    Radiology Results:   No results found.    "

## 2024-04-07 DIAGNOSIS — K58.1 IRRITABLE BOWEL SYNDROME WITH CONSTIPATION: ICD-10-CM

## 2024-04-09 RX ORDER — LINACLOTIDE 290 UG/1
CAPSULE, GELATIN COATED ORAL DAILY
Qty: 30 CAPSULE | Refills: 0 | Status: SHIPPED | OUTPATIENT
Start: 2024-04-09

## 2024-06-19 DIAGNOSIS — K58.1 IRRITABLE BOWEL SYNDROME WITH CONSTIPATION: ICD-10-CM

## 2024-06-19 RX ORDER — LINACLOTIDE 290 UG/1
CAPSULE, GELATIN COATED ORAL DAILY
Qty: 30 CAPSULE | Refills: 5 | Status: SHIPPED | OUTPATIENT
Start: 2024-06-19

## 2024-11-29 ENCOUNTER — TELEPHONE (OUTPATIENT)
Age: 59
End: 2024-11-29

## 2024-11-29 NOTE — TELEPHONE ENCOUNTER
Patients GI provider:  Dr. Cabezas    Number to return call: [phone]    Reason for call: Pt calling to schedule a yearly FU for her meds as well as check to see when she should schedule her next colonoscopy, she is due in 2025 but would like to know which month she should schedule in. For her FU, scheduled for 02.28, she says she will need a referral for her insurance and asks if that is something we can enter closer to date so she has it?     Scheduled procedure/appointment date if applicable: 02.28.25

## 2024-12-02 NOTE — TELEPHONE ENCOUNTER
Scanned in to [facility] the colon report. It was completed on 01/30/2020. Repeat every 5 years

## 2024-12-02 NOTE — TELEPHONE ENCOUNTER
Spoke to patient to let her know when her last colonoscopy was and that if she would like to call her PCP they can start the referral process but we do have a team here that will obtain her referral

## 2024-12-10 ENCOUNTER — PREP FOR PROCEDURE (OUTPATIENT)
Age: 59
End: 2024-12-10

## 2024-12-10 ENCOUNTER — TELEPHONE (OUTPATIENT)
Age: 59
End: 2024-12-10

## 2024-12-10 DIAGNOSIS — Z86.0100 HISTORY OF COLON POLYPS: Primary | ICD-10-CM

## 2024-12-10 NOTE — TELEPHONE ENCOUNTER
12/10/24  Screened by: Alejandra Sands MA    Referring Provider Evie    Pre- Screening:     There is no height or weight on file to calculate BMI.  Has patient been referred for a routine screening Colonoscopy? yes  Is the patient between 45-75 years old? yes      Previous Colonoscopy yes   If yes:    Date: 2020    Facility: [facility]    Reason:         Does the patient want to see a Gastroenterologist prior to their procedure OR are they having any GI symptoms? no    Has the patient been hospitalized or had abdominal surgery in the past 6 months? no    Does the patient use supplemental oxygen? no    Does the patient take Coumadin, Lovenox, Plavix, Elliquis, Xarelto, or other blood thinning medication? no    Has the patient had a stroke, cardiac event, or stent placed in the past year? no

## 2024-12-10 NOTE — TELEPHONE ENCOUNTER
Scheduled date of colonoscopy (as of today): 2/24/25    Physician performing colonoscopy:  Raulito    Location of colonoscopy: [facility]    Bowel prep reviewed with patient:  2 day SUPREP    Instructions reviewed with patient by:liliane    Clearances: na    Please send Suprep to pharmacy    Please EMAIL 2 day Suprep Instructions

## 2024-12-12 DIAGNOSIS — K58.1 IRRITABLE BOWEL SYNDROME WITH CONSTIPATION: ICD-10-CM

## 2024-12-12 RX ORDER — LINACLOTIDE 290 UG/1
CAPSULE, GELATIN COATED ORAL DAILY
Qty: 30 CAPSULE | Refills: 5 | Status: SHIPPED | OUTPATIENT
Start: 2024-12-12

## 2024-12-20 NOTE — TELEPHONE ENCOUNTER
Left message for patient that instructions emailed to patient and to call with any questions.    Procedure Prep:  Golytely 2 Day    Sent Via:  Email

## 2025-02-05 DIAGNOSIS — Z12.11 SCREENING FOR COLON CANCER: Primary | ICD-10-CM

## 2025-03-07 ENCOUNTER — OFFICE VISIT (OUTPATIENT)
Dept: GASTROENTEROLOGY | Facility: CLINIC | Age: 60
End: 2025-03-07
Payer: COMMERCIAL

## 2025-03-07 VITALS
DIASTOLIC BLOOD PRESSURE: 63 MMHG | WEIGHT: 180.8 LBS | BODY MASS INDEX: 35.5 KG/M2 | SYSTOLIC BLOOD PRESSURE: 121 MMHG | HEIGHT: 60 IN

## 2025-03-07 DIAGNOSIS — K21.9 GASTROESOPHAGEAL REFLUX DISEASE WITHOUT ESOPHAGITIS: ICD-10-CM

## 2025-03-07 DIAGNOSIS — Z86.0100 HISTORY OF COLON POLYPS: ICD-10-CM

## 2025-03-07 DIAGNOSIS — K63.8219 SMALL INTESTINAL BACTERIAL OVERGROWTH (SIBO): ICD-10-CM

## 2025-03-07 DIAGNOSIS — K58.1 IRRITABLE BOWEL SYNDROME WITH CONSTIPATION: Primary | ICD-10-CM

## 2025-03-07 DIAGNOSIS — K22.2 ESOPHAGEAL STRICTURE: ICD-10-CM

## 2025-03-07 PROCEDURE — 99214 OFFICE O/P EST MOD 30 MIN: CPT | Performed by: PHYSICIAN ASSISTANT

## 2025-03-07 RX ORDER — CEFUROXIME AXETIL 500 MG/1
1 TABLET ORAL 2 TIMES DAILY
COMMUNITY
Start: 2025-02-28

## 2025-03-07 RX ORDER — LINACLOTIDE 290 UG/1
290 CAPSULE, GELATIN COATED ORAL DAILY
Qty: 90 CAPSULE | Refills: 3 | Status: SHIPPED | OUTPATIENT
Start: 2025-03-07

## 2025-03-07 RX ORDER — POLYETHYLENE GLYCOL 3350 17 G/17G
17 POWDER, FOR SOLUTION ORAL DAILY
Qty: 850 G | Refills: 3 | Status: SHIPPED | OUTPATIENT
Start: 2025-03-07

## 2025-03-07 NOTE — PATIENT INSTRUCTIONS
-colonoscopy  -cont linzess 290 qd  -add Miralax 17g 1-3 times daily  -cont pantoprazole 40 mg daily  -antirelfux and antigas diet  -OV 1 year

## 2025-03-07 NOTE — PROGRESS NOTES
Name: Odessa Kumar      : 1965      MRN: [id number]  Encounter Provider: Kimberly Gutierrez PA-C  Encounter Date: 3/7/2025   Encounter department: [facility]  :  Assessment & Plan  Irritable bowel syndrome with constipation  IBS-C improved with Linzess 290.  Still notes intermittent constipation and bloating advised to get plenty of fluid and fiber in her diet and add MiraLAX 17 g up to 3 times daily if needed.  Advised to get  Orders:    linaCLOtide (Linzess) 290 MCG CAPS; Take 1 capsule by mouth daily    polyethylene glycol (GLYCOLAX) 17 GM/SCOOP powder; Take 17 g by mouth daily    Gastroesophageal reflux disease without esophagitis  Chronic reflux well-controlled with pantoprazole 40 mg daily.  Antireflux regimen lifestyle modifications were reviewed.  I offered to try to dose reduce PPI but she declined.  No evidence of Rodney's on endoscopy 10/2022.  Given chronic PPI use periodic vitamin B12 and magnesium level should be monitored.     -cont  pantoprazole 40 mg qd  History of colon polyps  History of colonic adenoma in past and due for colonoscopy now which is actually scheduled 3/31/2025 which she was encouraged to keep.       Small intestinal bacterial overgrowth (SIBO)  H/O SIBO on breath test  s/p Augmentin.  Has bloating now but improved with defecation suspect related to constipation.  Patient on probiotic.  If bloating becomes constant and not relieved by defecation advised to contact office and can consider repeating breath test.  A diet for reducing gas was reviewed.  Can try Gas-X as needed.  Advised to be up-to-date on GYN exam.       Esophageal stricture  History of esophageal stricture/ring  esophageal biopsies unremarkable.  Underwent empiric esophageal dilation on endoscopy 10/2022.  Currently asymptomatic.  Knows to call with recurrent issues.     Follow up: colon, 1 year OV      History of Present Illness    HPI  Odessa Alise Kumar is a 60 y.o. female with PMH asthma, DVT 2009, factor V Leiden (not on AC, AC not needed per pt as one has 1 gene), fibromyalgia, hypertension, hyperlipidemia, hypothyroidism, obesity being evaluated for yearly appointment in follow-up for IBS-C and GERD.  No family h/o GI malignancy.    EGD 10/2022 showed gastritis s/p 56 [address] Cast[address]eda esophageal dilation, gastric bx neg.    Colonoscopy 1/2020 for history of polyps and change in bowel habits showed internal hemorrhoids, tattoo at 25 cm without residual polyp, recommend repeat in 5 years.    Last eval by Dr. Cabezas 2/2024 reflux controlled with Protonix 40 daily.  IBS-C managed with Linzess 290 daily history of SIBO(given Augmentin 2020) and dysphagia but asymptomatic.    3/2024 CBC normal.    6/2024 CMP showed creatinine 0.89 GFR 76    Patient reports BM's every other day on Linzess 290 qd.  She still feels backed up at times with bloating improved with defecation.  Reflux well-controlled with pantoprazole 40 mg daily without breakthrough symptoms.  Denies NSAID use.  Denies dysphagia vomiting abdominal pain anorexia, weight loss.  Currently on Ceftin for URI.  Takes probiotic daily.    Objective   /63   Ht 5' (1.524 m)   Wt 82 kg (180 lb 12.8 oz)   BMI 35.31 kg/m²      Physical Exam  Constitutional: Well-developed, no acute distress  HEENT: normocephalic, mucous membranes moist.  Neck: Supple  Skin: warm and dry  Respiratory: Lungs are clear to auscultation B/L.  Cardiovascular: Heart is regular rate and rhythm.  Gastrointestinal: obese, Soft, nontender, nondistended with normal active bowel sounds.  No masses, guarding, rebound.   Rectal Exam: Deferred.  Extremities: No edema.  Neurologic: Nonfocal. A & O ×3.   Psychiatric: Normal affect.

## 2025-03-17 ENCOUNTER — ANESTHESIA EVENT (OUTPATIENT)
Dept: ANESTHESIOLOGY | Facility: AMBULATORY SURGERY CENTER | Age: 60
End: 2025-03-17

## 2025-03-17 ENCOUNTER — ANESTHESIA (OUTPATIENT)
Dept: ANESTHESIOLOGY | Facility: AMBULATORY SURGERY CENTER | Age: 60
End: 2025-03-17

## 2025-03-31 ENCOUNTER — ANESTHESIA EVENT (OUTPATIENT)
Dept: GASTROENTEROLOGY | Facility: AMBULATORY SURGERY CENTER | Age: 60
End: 2025-03-31

## 2025-03-31 ENCOUNTER — HOSPITAL ENCOUNTER (OUTPATIENT)
Dept: GASTROENTEROLOGY | Facility: AMBULATORY SURGERY CENTER | Age: 60
Discharge: HOME | End: 2025-03-31
Payer: COMMERCIAL

## 2025-03-31 ENCOUNTER — ANESTHESIA (OUTPATIENT)
Dept: GASTROENTEROLOGY | Facility: AMBULATORY SURGERY CENTER | Age: 60
End: 2025-03-31

## 2025-03-31 VITALS
BODY MASS INDEX: 35.34 KG/M2 | SYSTOLIC BLOOD PRESSURE: 110 MMHG | OXYGEN SATURATION: 95 % | DIASTOLIC BLOOD PRESSURE: 60 MMHG | RESPIRATION RATE: 24 BRPM | WEIGHT: 180 LBS | TEMPERATURE: 97.7 F | HEIGHT: 60 IN | HEART RATE: 104 BPM

## 2025-03-31 DIAGNOSIS — Z86.0100 HISTORY OF COLON POLYPS: ICD-10-CM

## 2025-03-31 PROCEDURE — G0121 COLON CA SCRN NOT HI RSK IND: HCPCS | Performed by: INTERNAL MEDICINE

## 2025-03-31 RX ORDER — PROPOFOL 10 MG/ML
INJECTION, EMULSION INTRAVENOUS AS NEEDED
Status: DISCONTINUED | OUTPATIENT
Start: 2025-03-31 | End: 2025-03-31

## 2025-03-31 RX ORDER — SODIUM CHLORIDE, SODIUM LACTATE, POTASSIUM CHLORIDE, CALCIUM CHLORIDE 600; 310; 30; 20 MG/100ML; MG/100ML; MG/100ML; MG/100ML
75 INJECTION, SOLUTION INTRAVENOUS CONTINUOUS
Status: DISCONTINUED | OUTPATIENT
Start: 2025-03-31 | End: 2025-04-04 | Stop reason: HOSPADM

## 2025-03-31 RX ADMIN — PROPOFOL 100 MG: 10 INJECTION, EMULSION INTRAVENOUS at 10:03

## 2025-03-31 RX ADMIN — PROPOFOL 200 MCG/KG/MIN: 10 INJECTION, EMULSION INTRAVENOUS at 10:00

## 2025-03-31 RX ADMIN — PROPOFOL 100 MG: 10 INJECTION, EMULSION INTRAVENOUS at 09:59

## 2025-03-31 RX ADMIN — SODIUM CHLORIDE, SODIUM LACTATE, POTASSIUM CHLORIDE, CALCIUM CHLORIDE: 600; 310; 30; 20 INJECTION, SOLUTION INTRAVENOUS at 09:56

## 2025-03-31 RX ADMIN — PROPOFOL 40 MG: 10 INJECTION, EMULSION INTRAVENOUS at 10:06

## 2025-03-31 NOTE — H&P
History and Physical -  [facility]  Odessa Kumar 60 y.o. female MRN: [id number]    HPI: Odessa Kumar is a 60 y.o. year old female who presents for colonoscopy for history of polyps    REVIEW OF SYSTEMS: Per the HPI, and otherwise unremarkable.    Historical Information   Past Medical History:   Diagnosis Date    Asthma     Clotting disorder (HCC) 2009    Factor V    Colon polyps     Deep vein thrombosis (DVT) (HCC)     Depression     Disease of thyroid gland     Factor V Leiden (HCC)     Fibromyalgia     GERD (gastroesophageal reflux disease)     Hypercholesteremia     Hypertension     Hypothyroid     IBS (irritable bowel syndrome)     Migraine     Obesity     Papanicolaou smear 04/06/2018    Uterine leiomyoma      Past Surgical History:   Procedure Laterality Date    COLONOSCOPY  12/15/2017    COLONOSCOPY  01/31/2020    COLONOSCOPY  01/30/2020    EGD  04/13/2012    ENDOMETRIAL BIOPSY  03/2019    FOOT SURGERY Bilateral 1998    MAMMO (HISTORICAL)  08/01/2020    [facility]    SINUS SURGERY  2002    UPPER GASTROINTESTINAL ENDOSCOPY       Social History   Social History     Substance and Sexual Activity   Alcohol Use No     Social History     Substance and Sexual Activity   Drug Use No     Social History     Tobacco Use   Smoking Status Never   Smokeless Tobacco Never     Family History   Problem Relation Age of Onset    Diabetes Mother     Hypertension Mother     Stroke Mother     Depression Mother     Diabetes Father     Hypertension Father     Alzheimer's disease Father     Heart disease Father         Congestive Heart Failure    Heart attack Father     Diabetes Sister     Polycystic ovary syndrome Sister     Thyroid disease Sister     Hypothyroidism Sister     Breast cancer Maternal Aunt     Colon cancer Neg Hx     Colon polyps Neg Hx        Meds/Allergies       Current Outpatient Medications:     albuterol (PROVENTIL HFA,VENTOLIN HFA) 90 mcg/act inhaler    atorvastatin  (LIPITOR) 10 mg tablet    Breo Ellipta 200-25 MCG/ACT inhaler    Cholecalciferol (VITAMIN D3) 59615 units TABS    cyanocobalamin 1000 MCG tablet    DULoxetine (CYMBALTA) 60 mg delayed release capsule    fluticasone (FLONASE) 50 mcg/act nasal spray    levothyroxine 75 mcg tablet    linaCLOtide (Linzess) 290 MCG CAPS    lisinopril-hydrochlorothiazide (PRINZIDE,ZESTORETIC) 20-25 MG per tablet    loratadine-pseudoephedrine (CLARITIN-D 12-HOUR) 5-120 mg per tablet    montelukast (SINGULAIR) 10 mg tablet    Omega-3 Fatty Acids (fish oil) 1,000 mg    pantoprazole (PROTONIX) 40 mg tablet    polyethylene glycol (GLYCOLAX) 17 GM/SCOOP powder    Probiotic Product (PROBIOTIC ADVANCED PO)    cefuroxime (CEFTIN) 500 mg tablet    polyethylene glycol (GOLYTELY) 4000 mL solution    SYMBICORT 160-4.5 MCG/ACT inhaler    Current Facility-Administered Medications:     lactated ringers infusion, 75 mL/hr, Intravenous, Continuous    Allergies   Allergen Reactions    Azithromycin Rash    Hyoscyamine Rash    Sulfamethoxazole Rash    Trimethoprim Rash       Objective     /72   Pulse 103   Temp 97.7 °F (36.5 °C) (Temporal)   Resp 17   Ht 5' (1.524 m)   Wt 81.6 kg (180 lb)   SpO2 96%   BMI 35.15 kg/m²     PHYSICAL EXAM    Gen: NAD AAOx3  Head: Normocephalic, Atraumatic  CV: S1S2 RRR no m/r/g  CHEST: Clear b/l no c/r/w  ABD: soft, +BS NT/ND  EXT: no edema    ASSESSMENT/PLAN:  This is a 60 y.o. year old female here for colonoscopy, and she is stable and optimized for her procedure.

## 2025-03-31 NOTE — ANESTHESIA PREPROCEDURE EVALUATION
Procedure:  COLONOSCOPY    Relevant Problems   ANESTHESIA (within normal limits)      CARDIO (within normal limits)      ENDO (within normal limits)      GI/HEPATIC (within normal limits)      /RENAL (within normal limits)      GYN (within normal limits)      HEMATOLOGY (within normal limits)      MUSCULOSKELETAL (within normal limits)      NEURO/PSYCH (within normal limits)      PULMONARY (within normal limits)     Asthma Hypertension   Depression Fibromyalgia   GERD (gastroesophageal reflux disease) Factor V Leiden (HCC)   Hypercholesteremia Hypothyroid   IBS (irritable bowel syndrome) Migraine   Obesity Deep vein thrombosis (DVT) (HCC)   Uterine leiomyoma Colon polyps   Papanicolaou smear Clotting disorder (HCC)   Asthma-Stable on routine Singulair  On no blood thinners. DVT 2009     Physical Exam    Airway    Mallampati score: II  TM Distance: >3 FB  Neck ROM: full     Dental   No notable dental hx     Cardiovascular      Pulmonary   Breath sounds clear to auscultation    Other Findings  post-pubertal.      Anesthesia Plan  ASA Score- 2     Anesthesia Type- IV sedation with anesthesia with ASA Monitors.         Additional Monitors:     Airway Plan:            Plan Factors-Exercise tolerance (METS): >4 METS.    Chart reviewed.        Patient is not a current smoker.              Induction- intravenous.    Postoperative Plan-         Informed Consent- Anesthetic plan and risks discussed with patient.  I personally reviewed this patient with the CRNA. Discussed and agreed on the Anesthesia Plan with the CRNA..

## 2025-03-31 NOTE — ANESTHESIA POSTPROCEDURE EVALUATION
Post-Op Assessment Note    CV Status:  Stable  Pain Score: 0    Pain management: adequate       Mental Status:  Alert and awake   Hydration Status:  Euvolemic   PONV Controlled:  Controlled   Airway Patency:  Patent     Post Op Vitals Reviewed: Yes    No anethesia notable event occurred.    Staff: Anesthesiologist, CRNA           Last Filed PACU Vitals:  Vitals Value Taken Time   Temp     Pulse 93    /59    Resp     SpO2 97%

## 2025-07-24 ENCOUNTER — TELEPHONE (OUTPATIENT)
Age: 60
End: 2025-07-24

## 2025-07-24 NOTE — TELEPHONE ENCOUNTER
Please place referral for Odessa and route encounter to [facility] Procedure Prior Authorizations POD for completion.    Odessa requires an insurance referral for the following:      Test Name / Order Name:     DX Code: Z12.39    Date Of Service: 8/14/2025    Location/Facility Name/Address/Phone #:   [facility]   [address]  [place] [address]      Location / Facility NPI:     Best Phone # To Reach The Patient: [phone]

## 2025-08-05 ENCOUNTER — TELEPHONE (OUTPATIENT)
Age: 60
End: 2025-08-05

## 2025-08-11 PROBLEM — M17.11 PRIMARY OSTEOARTHRITIS OF RIGHT KNEE: Status: ACTIVE | Noted: 2025-08-11

## 2025-08-11 PROBLEM — N94.10 DYSPAREUNIA, FEMALE: Status: ACTIVE | Noted: 2025-08-11

## 2025-08-11 PROBLEM — E78.00 HYPERCHOLESTEROLEMIA: Status: ACTIVE | Noted: 2025-08-11

## 2025-08-11 PROBLEM — J45.909 ASTHMA: Status: ACTIVE | Noted: 2025-08-11

## 2025-08-11 PROBLEM — K21.9 GASTROESOPHAGEAL REFLUX DISEASE: Status: ACTIVE | Noted: 2025-08-11

## 2025-08-11 PROBLEM — N95.2 ATROPHIC VAGINITIS: Status: ACTIVE | Noted: 2025-08-11

## 2025-08-11 PROBLEM — K58.9 IBS (IRRITABLE BOWEL SYNDROME): Status: ACTIVE | Noted: 2025-08-11

## 2025-08-11 PROBLEM — M79.7 FIBROMYALGIA: Status: ACTIVE | Noted: 2025-08-11

## 2025-08-11 PROBLEM — F32.9 MAJOR DEPRESSION: Status: ACTIVE | Noted: 2025-08-11

## 2025-08-11 PROBLEM — I10 BENIGN HYPERTENSION: Status: ACTIVE | Noted: 2025-08-11

## 2025-08-11 PROBLEM — E66.9 OBESITY, UNSPECIFIED: Status: ACTIVE | Noted: 2025-08-11

## 2025-08-11 PROBLEM — J30.9 ALLERGIC RHINITIS: Status: ACTIVE | Noted: 2025-08-11

## 2025-08-11 PROBLEM — E03.9 ACQUIRED HYPOTHYROIDISM: Status: ACTIVE | Noted: 2025-08-11

## 2025-08-11 PROBLEM — D68.51 HETEROZYGOUS FACTOR V LEIDEN MUTATION (HCC): Status: ACTIVE | Noted: 2025-08-11

## 2025-08-11 PROBLEM — N93.0 PCB (POST COITAL BLEEDING): Status: ACTIVE | Noted: 2025-08-11

## 2025-08-14 ENCOUNTER — HOSPITAL ENCOUNTER (OUTPATIENT)
Age: 60
Discharge: HOME | End: 2025-08-14
Attending: OBSTETRICS & GYNECOLOGY
Payer: COMMERCIAL

## 2025-08-19 DIAGNOSIS — J45.909 ASTHMA, UNSPECIFIED ASTHMA SEVERITY, UNSPECIFIED WHETHER COMPLICATED, UNSPECIFIED WHETHER PERSISTENT: Primary | ICD-10-CM

## 2025-08-21 RX ORDER — MONTELUKAST SODIUM 10 MG/1
10 TABLET ORAL
Qty: 90 TABLET | Refills: 3 | Status: SHIPPED | OUTPATIENT
Start: 2025-08-21